# Patient Record
Sex: MALE | Race: WHITE
[De-identification: names, ages, dates, MRNs, and addresses within clinical notes are randomized per-mention and may not be internally consistent; named-entity substitution may affect disease eponyms.]

---

## 2017-02-26 ENCOUNTER — HOSPITAL ENCOUNTER (EMERGENCY)
Dept: HOSPITAL 45 - C.EDB | Age: 57
Discharge: HOME | End: 2017-02-26
Payer: COMMERCIAL

## 2017-02-26 VITALS
BODY MASS INDEX: 24.31 KG/M2 | WEIGHT: 179.46 LBS | HEIGHT: 72.01 IN | WEIGHT: 179.46 LBS | HEIGHT: 72.01 IN | BODY MASS INDEX: 24.31 KG/M2

## 2017-02-26 VITALS — SYSTOLIC BLOOD PRESSURE: 147 MMHG | DIASTOLIC BLOOD PRESSURE: 65 MMHG | HEART RATE: 68 BPM | OXYGEN SATURATION: 98 %

## 2017-02-26 VITALS — TEMPERATURE: 98.6 F

## 2017-02-26 DIAGNOSIS — R51: Primary | ICD-10-CM

## 2017-02-26 DIAGNOSIS — I10: ICD-10-CM

## 2017-02-26 DIAGNOSIS — R52: ICD-10-CM

## 2017-02-26 DIAGNOSIS — Z82.49: ICD-10-CM

## 2017-02-26 DIAGNOSIS — E78.5: ICD-10-CM

## 2017-02-26 DIAGNOSIS — K58.9: ICD-10-CM

## 2017-02-26 DIAGNOSIS — M54.2: ICD-10-CM

## 2017-02-26 LAB
ALBUMIN/GLOB SERPL: 1 {RATIO} (ref 0.9–2)
ALP SERPL-CCNC: 168 U/L (ref 45–117)
ALT SERPL-CCNC: 121 U/L (ref 12–78)
ANION GAP SERPL CALC-SCNC: 6 MMOL/L (ref 3–11)
APPEARANCE CSF: CLEAR
APPEARANCE UR: CLEAR
AST SERPL-CCNC: 46 U/L (ref 15–37)
BASOPHILS # BLD: 0.03 K/UL (ref 0–0.2)
BASOPHILS NFR BLD: 0.4 %
BILIRUB UR-MCNC: (no result) MG/DL
BUN SERPL-MCNC: 11 MG/DL (ref 7–18)
BUN/CREAT SERPL: 13.3 (ref 10–20)
CALCIUM SERPL-MCNC: 9.3 MG/DL (ref 8.5–10.1)
CHLORIDE SERPL-SCNC: 101 MMOL/L (ref 98–107)
CO2 SERPL-SCNC: 32 MMOL/L (ref 21–32)
COLOR CSF: COLORLESS
COLOR UR: (no result)
COMPLETE: YES
CREAT CL PREDICTED SERPL C-G-VRATE: 110.4 ML/MIN
CREAT SERPL-MCNC: 0.83 MG/DL (ref 0.6–1.4)
CSF CHEMISTRY TUBE #: 2
EOSINOPHIL NFR BLD AUTO: 258 K/UL (ref 130–400)
FLUAV RNA SPEC QL NAA+PROBE: (no result)
FLUBV RNA SPEC QL NAA+PROBE: (no result)
GLOBULIN SER-MCNC: 3.9 GM/DL (ref 2.5–4)
GLUCOSE SERPL-MCNC: 95 MG/DL (ref 70–99)
HCT VFR BLD CALC: 41.2 % (ref 42–52)
IG%: 0.5 %
IMM GRANULOCYTES NFR BLD AUTO: 22.9 %
LYME DISEASE AB IGG: (no result)
LYME DISEASE AB IGM: (no result)
LYMPHOCYTES # BLD: 1.75 K/UL (ref 1.2–3.4)
MANUAL MICROSCOPIC REQUIRED?: NO
MCH RBC QN AUTO: 28.7 PG (ref 25–34)
MCHC RBC AUTO-ENTMCNC: 34.5 G/DL (ref 32–36)
MCV RBC AUTO: 83.4 FL (ref 80–100)
MONOCYTES NFR BLD: 7.7 %
NEUTROPHILS # BLD AUTO: 0.8 %
NEUTROPHILS NFR BLD AUTO: 67.7 %
NITRITE UR QL STRIP: (no result)
PH UR STRIP: 5.5 [PH] (ref 4.5–7.5)
PMV BLD AUTO: 10.5 FL (ref 7.4–10.4)
POTASSIUM SERPL-SCNC: 3.9 MMOL/L (ref 3.5–5.1)
PROT CSF-MCNC: 40.8 MG/DL (ref 15–45)
RBC # BLD AUTO: 4.94 M/UL (ref 4.7–6.1)
REVIEW REQ?: NO
SODIUM SERPL-SCNC: 139 MMOL/L (ref 136–145)
SP GR UR STRIP: 1.02 (ref 1–1.03)
URINE BILL WITH OR WITHOUT MIC: (no result)
URINE EPITHELIAL CELL AUTO: (no result) /LPF (ref 0–5)
UROBILINOGEN UR-MCNC: (no result) MG/DL
WBC # BLD AUTO: 7.63 K/UL (ref 4.8–10.8)
XANTHOCHROMIA CSF QL: (no result)
ZZUR CULT IF INDIC CLEAN CATCH: NO

## 2017-02-26 NOTE — DIAGNOSTIC IMAGING REPORT
CHEST ONE VIEW PORTABLE



HISTORY:       Evaluate Fever/Sepsis



COMPARISON: Chest 7/2/2014.



FINDINGS: The lungs are clear. Cardiac silhouette is normal in size. No pleural

effusions. No pneumothorax.



IMPRESSION:

No acute process.







Electronically signed by:  Trip Avina M.D.

2/26/2017 1:32 PM



Dictated Date/Time:  2/26/2017 1:32 PM

## 2017-02-26 NOTE — EMERGENCY ROOM VISIT NOTE
History


Report prepared by Farida:  Rabia Donato


Under the Supervision of:  Dr. Andrews Gusman M.D.


First contact with patient:  12:38


Chief Complaint:  FLU LIKE SX


Stated Complaint:  NECK STIFFNESS,FLU LIKE SYMPTOMS





History of Present Illness


The patient is a 56 year old male who presents to the Emergency Room with 

complaints of a persistent illness that started 10 days ago.  He currently 

rates his discomfort as a 3/10 in severity.  The patient states that last 

Thursday he had eaten a sandwich at a local restaurant and became sick 

afterwards.  He states that he had been vomiting and experiencing diarrhea, but 

states that he felt better after a day.  The patient states that last Saturday 

he started experiencing chills, but denies any fever.  He states that he had 

been up all night, so then that Sunday morning he went to [facility].  The 

patient states that while there he was tested for the flu, but was discharged 

on Prednisone and Azithromycin.  He states that he finished the Z-pack on 

Wednesday and the Prednisone on Thursday.  The patient states that he still 

felt clammy and diaphoretic, and has noticed a dry cough.  He states that he 

has noticed a squeezing pressure behind his eyes.  The patient additionally 

associates rhinorrhea.  The patient states that yesterday he went to the gym 

and did cardio, but states that he felt fine after.  He states that he started 

with the chills again last evening.  The patient states that last night around 

2100 he began experiencing neck stiffness, joint pain, and a headache.  He 

states that [facility] and was sent to the emergency 

department for a lumbar puncture to rule out meningitis.  The patient denies 

any sore throat or urinary symptoms.  He states that he has recently increased 

his fluid intake.  The patient denies any recent travel.  He denies being on 

any blood thinners, but states that he takes Lipitor for his cholesterol.  The 

patient notes a history of a diastolic heart murmur, noting that he follows 

with Dr. Bartlett.  He denies any history of endocarditis.  The patient denies any 

recent dental work.





   Source of History:  patient


   Onset:  10 days ago


   Position:  other (global)


   Symptom Intensity:  3/10


   Quality:  other (illness)


   Timing:  other (persistent)


   Associated Symptoms:  + chills, + diaphoresis, + neck pain (and stiffness), 

No fevers, No sorethroat, No urinary symptoms


Note:


Associated Symptoms: rhinorrhea, joint aches, feeling clammy





Review of Systems


See HPI for pertinent positives & negatives. A total of 10 systems reviewed and 

were otherwise negative.





Past Medical & Surgical


Medical Problems:


(1) Hyperlipidemia Nec/Nos


(2) Hypertension Nos


(3) Irritable Bowel Syndrome








Family History





FH: heart disease


FHx: gallbladder disease


Kidney disease


Kidney stones





Social History


Smoking Status:  Never Smoker


Alcohol Use:  none


Drug Use:  none


Marital Status:  


Housing Status:  lives with family


Occupation Status:  employed





Current/Historical Medications


Scheduled


Aspirin (Aspir-81), 1 TAB PO DAILY


Atorvastatin (Lipitor), 10 MG PO EVERY OTHER DAY


Meloxicam (Meloxicam), 15 MG PO DAILY





Allergies


Coded Allergies:  


     No Known Allergies (Unverified , 2/26/17)





Physical Exam


Vital Signs











  Date Time  Temp Pulse Resp B/P Pulse Ox O2 Delivery O2 Flow Rate FiO2


 


2/26/17 13:55  65 18 132/57 97 Room Air  


 


2/26/17 12:25  75 18 163/69 99 Room Air  


 


2/26/17 11:20 37.0 87 18 185/76 97 Room Air  











Physical Exam


GENERAL: Patient is in no acute distress.


HEENT: No acute trauma, normocephalic atraumatic, mucous membranes moist, no 

nasal congestion, no scleral icterus.


NECK: Tenderness with palpation to the right posterior and lateral neck 

muscles.  Equal carotid upstrokes bilaterally, no cellulitis, no stridor.


LUNGS: Clear to auscultation bilaterally, no wheeze, no rhonchi, breath sounds 

equal.


HEART: 3/6 diastolic murmur with a regular rhythm and rate.


ABDOMEN: Soft, nontender, bowel sounds positive, no hernias, no peritonitis.


EXTREMITIES: No cyanosis or edema, full range of motion of all the joints 

without pain or difficulty, no signs for acute trauma.


NEUROLOGIC: Oriented x 3, no acute motor or sensory deficits, no focal weakness.


SKIN: No rash, no jaundice, no diaphoresis.





Medical Decision & Procedures


ER Provider


Diagnostic Interpretation:


X-ray results as stated below per interpretation by me and the radiologist: 





CHEST ONE VIEW PORTABLE





HISTORY:       Evaluate Fever/Sepsis





COMPARISON: Chest 7/2/2014.





FINDINGS: The lungs are clear. Cardiac silhouette is normal in size. No pleural


effusions. No pneumothorax.





IMPRESSION:


No acute process.





Electronically signed by:  Trip Avina M.D.


2/26/2017 1:32 PM





Dictated Date/Time:  2/26/2017 1:32 PM





Laboratory Results


2/26/17 12:22








Red Blood Count 4.94, Mean Corpuscular Volume 83.4, Mean Corpuscular Hemoglobin 

28.7, Mean Corpuscular Hemoglobin Concent 34.5, Mean Platelet Volume 10.5, 

Neutrophils (%) (Auto) 67.7, Lymphocytes (%) (Auto) 22.9, Monocytes (%) (Auto) 

7.7, Eosinophils (%) (Auto) 0.8, Basophils (%) (Auto) 0.4, Neutrophils # (Auto) 

5.16, Lymphocytes # (Auto) 1.75, Monocytes # (Auto) 0.59, Eosinophils # (Auto) 

0.06, Basophils # (Auto) 0.03





2/26/17 12:22

















Test


  2/26/17


00:00 2/26/17


11:45 2/26/17


12:22 2/26/17


13:04


 


CSF Color COLORLESS    


 


CSF Appearance CLEAR    


 


CSF WBC 3 /uL (0-5)    


 


CSF RBC 2 /uL (0)    


 


CSF Xanthrochromic


  NO


XANTHOCHROMIA 


  


  


 


 


CSF Cell Count Tube # 4    


 


CSF Polynuclear WBCs (%)  %    


 


CSF Chemistry Tube # 2    


 


CSF Glucose


  58 mg/dl


(40-70) 


  


  


 


 


CSF Total Protein


  40.8 mg/dl


(15.0-45.0) 


  


  


 


 


Urine Color  DK YELLOW   


 


Urine Appearance  CLEAR (CLEAR)   


 


Urine pH  5.5 (4.5-7.5)   


 


Urine Specific Gravity


  


  1.020


(1.000-1.030) 


  


 


 


Urine Protein  TRACE (NEG)   


 


Urine Glucose (UA)  NEG (NEG)   


 


Urine Ketones  NEG (NEG)   


 


Urine Occult Blood  NEG (NEG)   


 


Urine Nitrite  NEG (NEG)   


 


Urine Bilirubin  NEG (NEG)   


 


Urine Urobilinogen  NEG (NEG)   


 


Urine Leukocyte Esterase  NEG (NEG)   


 


Urine WBC (Auto)  1-5 /hpf (0-5)   


 


Urine RBC (Auto)  0-4 /hpf (0-4)   


 


Urine Hyaline Casts (Auto)  1-5 /lpf (0-5)   


 


Urine Epithelial Cells (Auto)


  


  5-10 /lpf


(0-5) 


  


 


 


Urine Bacteria (Auto)  NEG (NEG)   


 


White Blood Count


  


  


  7.63 K/uL


(4.8-10.8) 


 


 


Red Blood Count


  


  


  4.94 M/uL


(4.7-6.1) 


 


 


Hemoglobin


  


  


  14.2 g/dL


(14.0-18.0) 


 


 


Hematocrit   41.2 % (42-52)  


 


Mean Corpuscular Volume


  


  


  83.4 fL


() 


 


 


Mean Corpuscular Hemoglobin


  


  


  28.7 pg


(25-34) 


 


 


Mean Corpuscular Hemoglobin


Concent 


  


  34.5 g/dl


(32-36) 


 


 


Platelet Count


  


  


  258 K/uL


(130-400) 


 


 


Mean Platelet Volume


  


  


  10.5 fL


(7.4-10.4) 


 


 


Neutrophils (%) (Auto)   67.7 %  


 


Lymphocytes (%) (Auto)   22.9 %  


 


Monocytes (%) (Auto)   7.7 %  


 


Eosinophils (%) (Auto)   0.8 %  


 


Basophils (%) (Auto)   0.4 %  


 


Neutrophils # (Auto)


  


  


  5.16 K/uL


(1.4-6.5) 


 


 


Lymphocytes # (Auto)


  


  


  1.75 K/uL


(1.2-3.4) 


 


 


Monocytes # (Auto)


  


  


  0.59 K/uL


(0.11-0.59) 


 


 


Eosinophils # (Auto)


  


  


  0.06 K/uL


(0-0.5) 


 


 


Basophils # (Auto)


  


  


  0.03 K/uL


(0-0.2) 


 


 


RDW Standard Deviation


  


  


  38.7 fL


(36.4-46.3) 


 


 


RDW Coefficient of Variation


  


  


  12.8 %


(11.5-14.5) 


 


 


Immature Granulocyte % (Auto)   0.5 %  


 


Immature Granulocyte # (Auto)


  


  


  0.04 K/uL


(0.00-0.02) 


 


 


Erythrocyte Sedimentation Rate


  


  


  38 mm/hr


(0-14) 


 


 


Anion Gap


  


  


  6.0 mmol/L


(3-11) 


 


 


Est Creatinine Clear Calc


Drug Dose 


  


  110.4 ml/min 


  


 


 


Estimated GFR (


American) 


  


  114.8 


  


 


 


Estimated GFR (Non-


American 


  


  99.1 


  


 


 


BUN/Creatinine Ratio   13.3 (10-20)  


 


Calcium Level


  


  


  9.3 mg/dl


(8.5-10.1) 


 


 


Total Bilirubin


  


  


  0.6 mg/dl


(0.2-1) 


 


 


Aspartate Amino Transf


(AST/SGOT) 


  


  46 U/L (15-37) 


  


 


 


Alanine Aminotransferase


(ALT/SGPT) 


  


  121 U/L


(12-78) 


 


 


Alkaline Phosphatase


  


  


  168 U/L


() 


 


 


Total Protein


  


  


  7.7 gm/dl


(6.4-8.2) 


 


 


Albumin


  


  


  3.8 gm/dl


(3.4-5.0) 


 


 


Globulin


  


  


  3.9 gm/dl


(2.5-4.0) 


 


 


Albumin/Globulin Ratio   1.0 (0.9-2)  


 


Lyme Disease IgG Antibody   NEG (NEG)  


 


Lyme Disease IgM Antibody   NEG (NEG)  


 


Monoscreen   NEG (NEG)  


 


Influenza Type A (RT-PCR)


  


  


  


  Neg for Influ


A (NEG)


 


Influenza Type B (RT-PCR)


  


  


  


  Neg for Influ


B (NEG)








Laboratory results reviewed by me.





Medications Administered











 Medications


  (Trade)  Dose


 Ordered  Sig/Jailyn


 Route  Start Time


 Stop Time Status Last Admin


Dose Admin


 


 Sodium Chloride


  (Nss 1000ml)  1,000 ml @ 


 200 mls/hr  Q5H STAT


 IV  2/26/17 12:47


 2/26/17 17:46  2/26/17 13:12


200 MLS/HR


 


 Acetaminophen


  (Tylenol Tab)  1,000 mg  NOW  STAT


 PO  2/26/17 12:47


 2/26/17 12:53 DC 2/26/17 13:15


1,000 MG


 


 Lorazepam


  (Ativan Inj)  0.5 mg  NOW  STAT


 IV  2/26/17 12:47


 2/26/17 12:53 DC 2/26/17 13:16


0.5 MG











Procedure


Lumbar Puncture





Indication: headache.





Verbal consent was obtained after the risks and benefits were explained, 

including but not limited to headache, bleeding/clotting, scarring, infection, 

pain, and bone/joint/nerve damage. At this time, the risks of the procedure are 

less than the risks of NOT performing the procedure. A time out was taken and 

the correct patient and site identified. The patient was placed in the seated 

position and the back was prepped with betadine and draped in the standard 

fashion. The L3 intervertebral space was identified, anesthetized locally with 1

% lidocaine without epinephrine, and the spinal needle was inserted through the 

skin with the bevel parallel to the dural fibers. The needle was carefully 

advanced into the lumbar cistern and 4 tubes of clear CSF was obtained. The 

stylet was replaced and the needle was removed. A bandaid was placed and the 

patient was placed in the supine position. The patient tolerated the procedure 

well and there were no complications.





ED Course


1239: The patient was evaluated in room B11B. A complete history and physical 

exam was performed.





1247: Ordered Ativan Inj 0.5 mg IV, Tylenol Tab 1000 mg PO, Sodium Chloride 

1000 ml @ 200 mls/hr IV.





1300: Ordered Lidocaine HCl 20 ml INFIL.





1346: I performed the lumbar puncture at this time.  See procedure note for 

further detail.





1500: I reevaluated the patient and he is doing well.  I discussed all the exam 

findings with him and I discussed the treatment plan.  He verbalized complete 

understanding and agreement.  He is ready to go home and follow up with Dr. Bartlett tomorrow.





1503: Ordered Rocephin Inj 1 gm IV.





Medical Decision


The patient is a 56 year old male who presents to the ED with complaints of 

illness.  Differential diagnoses considered include viral illness, endocarditis

, lyme disease, pneumonia, meningitis, electrolyte imbalance, influenza, UTI, 

dehydration.





There is no leukocytosis or concerning anemia.  Sedimentation rate is not 

markedly elevated.  There was no significant electrolyte abnormality or kidney 

failure.  A very mild hepatitis was noted.  Mono testing, Lyme testing, 

influenza testing were all negative.  Chest x-ray does not show pneumonia or 

pneumothorax.  On exam, there was no cellulitis.  CSF fluid does not show 

evidence for meningitis.  Blood cultures are pending. 





The patient's illness is likely viral, I am though somewhat concerned because 

of his cardiac murmur.  He is followed on a regular basis for this murmur.  

Endocarditis can sometimes present like this.





The patient received IV saline, IV Ativan, IV Zofran.  He was given oral 

Tylenol.  He did not want anything further for pain.  He was given 1 dose of IV 

ceftriaxone for antibiotic coverage for the possibility of endocarditis-we 

await the blood culture results.





The patient has a follow-up with his cardiologist tomorrow, he can return here 

if worsening.  He will follow with his family doctor as well.  Motrin, Tylenol, 

rest and hydration were encouraged.  Heat to the neck may help as well.  If 

worsening, the patient can return for reassessment.





Impression





 Primary Impression:  


 Headache


 Additional Impressions:  


 Neck pain


 Body aches





Scribe Attestation


The scribe's documentation has been prepared under my direction and personally 

reviewed by me in its entirety. I confirm that the note above accurately 

reflects all work, treatment, procedures, and medical decision making performed 

by me.





Departure Information


Dispostion


Home / Self-Care





Referrals


An Mejía MD (PCP)





Forms


HOME CARE DOCUMENTATION FORM,                                                 

               IMPORTANT VISIT INFORMATION, Work Instructions





Patient Instructions


My [facility]





Additional Instructions





see Dr. Bartlett tomorrow as scheduled


see sherri james this week for recheck as well


rest


fluids


motrin 600 mg 3x per day for 4 days


tylenol for pain and fever as needed


heat to the neck may help


return if worsening





we have blood cultures pending and will call you with positive results





Problem Qualifiers

## 2017-02-27 ENCOUNTER — HOSPITAL ENCOUNTER (INPATIENT)
Dept: HOSPITAL 45 - C.EDB | Age: 57
LOS: 2 days | Discharge: HOME HEALTH | DRG: 871 | End: 2017-03-01
Attending: FAMILY MEDICINE | Admitting: FAMILY MEDICINE
Payer: COMMERCIAL

## 2017-02-27 VITALS
TEMPERATURE: 98.42 F | DIASTOLIC BLOOD PRESSURE: 68 MMHG | HEART RATE: 78 BPM | OXYGEN SATURATION: 97 % | SYSTOLIC BLOOD PRESSURE: 148 MMHG

## 2017-02-27 VITALS
WEIGHT: 174.39 LBS | BODY MASS INDEX: 23.62 KG/M2 | HEIGHT: 72 IN | HEIGHT: 72 IN | BODY MASS INDEX: 23.62 KG/M2 | WEIGHT: 174.39 LBS

## 2017-02-27 VITALS
OXYGEN SATURATION: 98 % | DIASTOLIC BLOOD PRESSURE: 71 MMHG | SYSTOLIC BLOOD PRESSURE: 157 MMHG | TEMPERATURE: 99.5 F | HEART RATE: 69 BPM

## 2017-02-27 VITALS
TEMPERATURE: 98.6 F | SYSTOLIC BLOOD PRESSURE: 127 MMHG | HEART RATE: 68 BPM | DIASTOLIC BLOOD PRESSURE: 61 MMHG | OXYGEN SATURATION: 96 %

## 2017-02-27 VITALS — OXYGEN SATURATION: 98 % | DIASTOLIC BLOOD PRESSURE: 71 MMHG | TEMPERATURE: 99.5 F | SYSTOLIC BLOOD PRESSURE: 157 MMHG

## 2017-02-27 VITALS
HEART RATE: 72 BPM | TEMPERATURE: 98.78 F | DIASTOLIC BLOOD PRESSURE: 69 MMHG | OXYGEN SATURATION: 97 % | SYSTOLIC BLOOD PRESSURE: 163 MMHG

## 2017-02-27 VITALS — OXYGEN SATURATION: 98 %

## 2017-02-27 DIAGNOSIS — A40.0: Primary | ICD-10-CM

## 2017-02-27 DIAGNOSIS — K58.9: ICD-10-CM

## 2017-02-27 DIAGNOSIS — E78.5: ICD-10-CM

## 2017-02-27 DIAGNOSIS — I33.0: ICD-10-CM

## 2017-02-27 DIAGNOSIS — I35.1: ICD-10-CM

## 2017-02-27 LAB
ANION GAP SERPL CALC-SCNC: 3 MMOL/L (ref 3–11)
BASOPHILS # BLD: 0.03 K/UL (ref 0–0.2)
BASOPHILS NFR BLD: 0.4 %
BUN SERPL-MCNC: 13 MG/DL (ref 7–18)
BUN/CREAT SERPL: 17.1 (ref 10–20)
CALCIUM SERPL-MCNC: 9.2 MG/DL (ref 8.5–10.1)
CHLORIDE SERPL-SCNC: 99 MMOL/L (ref 98–107)
CO2 SERPL-SCNC: 33 MMOL/L (ref 21–32)
COMPLETE: YES
CREAT CL PREDICTED SERPL C-G-VRATE: 120.6 ML/MIN
CREAT SERPL-MCNC: 0.76 MG/DL (ref 0.6–1.4)
CRP SERPL-MCNC: 5.34 MG/DL (ref 0–0.29)
EOSINOPHIL NFR BLD AUTO: 270 K/UL (ref 130–400)
GLUCOSE SERPL-MCNC: 87 MG/DL (ref 70–99)
HCT VFR BLD CALC: 40.1 % (ref 42–52)
IG%: 0.7 %
IMM GRANULOCYTES NFR BLD AUTO: 27.9 %
LYMPHOCYTES # BLD: 2.31 K/UL (ref 1.2–3.4)
MCH RBC QN AUTO: 29 PG (ref 25–34)
MCHC RBC AUTO-ENTMCNC: 34.2 G/DL (ref 32–36)
MCV RBC AUTO: 84.8 FL (ref 80–100)
MONOCYTES NFR BLD: 7.9 %
NEUTROPHILS # BLD AUTO: 0.7 %
NEUTROPHILS NFR BLD AUTO: 62.4 %
PMV BLD AUTO: 10.4 FL (ref 7.4–10.4)
POTASSIUM SERPL-SCNC: 3.9 MMOL/L (ref 3.5–5.1)
RBC # BLD AUTO: 4.73 M/UL (ref 4.7–6.1)
SODIUM SERPL-SCNC: 135 MMOL/L (ref 136–145)
WBC # BLD AUTO: 8.28 K/UL (ref 4.8–10.8)

## 2017-02-27 RX ADMIN — VANCOMYCIN HYDROCHLORIDE SCH MLS/HR: 1 INJECTION, POWDER, LYOPHILIZED, FOR SOLUTION INTRAVENOUS at 22:21

## 2017-02-27 RX ADMIN — ACETAMINOPHEN PRN MG: 325 TABLET ORAL at 18:09

## 2017-02-27 RX ADMIN — ACETAMINOPHEN PRN MG: 325 TABLET ORAL at 22:30

## 2017-02-27 NOTE — INFECT. DISEASE CONSULTATION
DATE OF CONSULTATION:  02/27/2017

 

REQUESTING PHYSICIAN:  Darien Alcantara DO

 

HISTORY OF PRESENT ILLNESS:  This is a 56-year-old gentleman who was admitted

after he was contacted by the Emergency Room that he had positive blood

cultures.  He was initially seen in the Emergency Room yesterday when he

presented with cervical pain, headache and subjective fevers and chills at

home.  He states this started almost 2 weeks ago.  He was contacted by the

Emergency Room, when his blood cultures returned positive today with GPCs. 

He has subsequently been admitted and started on vancomycin and Rocephin.  He

did have a lumbar puncture as part of his workup yesterday which was

essentially negative.  He had 3 WBCs.  He had colorless fluid and a negative

Gram stain.  CSF culture is pending.  He did have a Lyme titer drawn

yesterday, which was negative.  His flu swab was negative.  He was given a 1

time dose of Rocephin yesterday in the ER prior to his departure.  He does

have a history of aortic regurgitation and he does get serial echocardiograms

every 6 months.  He has had no change in his echo findings.  His last echo

was done in January.  He did have dental work on February 10th which he

describes as a routine cleaning.  He does not take prophylactic antibiotics

for any dental work.  His other injuries include an injury to the right

second digit sometime ago, between 4 and 5 weeks ago at the gym when he had a

crush injury from weights.  He did have a minor hematoma but that has

resolved completely.  He also does complain of a history of right elbow

injury which did require multiple courses of antibiotics and an eventual

abscess drainage in September 2015.  He does not have any residual complaints

from that injury.  He has had no travel recently.  He currently is retired. 

He does have 1 daughter in the home who has had multiple cardiac surgeries

and does require tracheostomy; however, he states there have been no sick

contacts and she has been doing well.  He states that around 1-1/2 to 2 weeks

ago after eating out and having a salad and a sandwich, he did have 1 episode

of diarrhea.  He was out with his wife and she did not have any similar

complaints.  He denies any fevers and chills at that time.  He states over

the next few days, he did feel lethargic and had very little energy.  He did

have periods of subjective chills but does not feel that he had a fever.  He

denies any chest pain associated with this.  He was seen at an urgent care

centered within the last week and was given a Z-DARCI and prednisone taper.  He

states he felt well after that but only for a few days and then his symptoms

began to return.  This ended with severe headache and neck pain yesterday

which required him to present to the Emergency Room where he underwent a

lumbar puncture.  Again, this was unremarkable.  He is due to have an

echocardiogram today and an MRI of the cervical spine.  He is on tentatively

tomorrow for a DEIDRE depending on the results of his transthoracic echo.  He

was placed on vancomycin and Rocephin.  His white blood cell count has been

normal and he has been afebrile; however, his sed rate is elevated at 40 and

his CRP is 5.3.  Repeat blood cultures have not been done to date.  All

remaining review of systems are reviewed and are negative except as noted

above.

 

FAMILY HISTORY:  Noncontributory.

 

PAST MEDICAL HISTORY:  Significant for aortic regurgitation.

 

PAST SURGICAL HISTORY:  There is no surgical history.

 

SOCIAL HISTORY:  Negative for tobacco use or drug use.  He drinks

occasionally.  He is  and lives with his family.  He does not have any

recent travel.  He has no known sick contacts.

 

ALLERGIES:  No known drug allergies.

 

CURRENT MEDICATIONS:  Tylenol, Maalox, milk of magnesia, Ambien, Nitrostat,

and MiraLax.  He has also been restarted on Rocephin and vancomycin.

 

LABORATORY STUDIES:  CBC reveals a white blood cell count of 8.2, hemoglobin

13.7, hematocrit 40.1, platelets are 270, sed rate is 40.  Chemistry panel

reveals a sodium of 135, potassium 3.9, chloride 99, bicarbonate 33, BUN 13,

creatinine 0.7, glucose is 87.  CRP is 5.3.  Again, blood cultures from the

26th are growing gram positive cocci.  Chest x-ray done in the ER yesterday

was unremarkable.

 

ASSESSMENT AND PLAN:  Gram-positive septicemia with history of heart murmur

concerning for endocarditis, especially with recent dental work.  He will be

continued on broad spectrum antibiotics.  I will follow the result of his MRI

as well as his transthoracic echo.  He may in fact need transesophageal

echocardiography in the morning and will be made n.p.o. tentatively for this.

 Repeat blood cultures will be obtained.  We will follow along with you. 

Thank you for this consultation.

## 2017-02-27 NOTE — DIAGNOSTIC IMAGING REPORT
MRI OF THE CERVICAL SPINE WITH AND WITHOUT CONTRAST



CLINICAL HISTORY: Neck pain. Positive blood cultures. Endocarditis. Septicemia. 

   



COMPARISON: None.



TECHNIQUE:  Utilizing a 1.5 Farideh magnet and dedicated coil, multiplanar,

multiecho imaging of the cervical spine was performed before and after

intravenous administration of 8 of Gadavist.



FINDINGS: 

 

Alignment of the cervical spine is anatomic. Vertebral body heights are

maintained. There is no marrow replacement. Discogenic changes centered at the

C5-C6 level are noted. Cervical cord signal and caliber are normal. There is no

intracanalicular mass or fluid collection. Visualized portions of the posterior

fossa are unremarkable. There is no abnormal enhancement within the cervical

canal. Paravertebral soft tissues are unremarkable. There is no evidence for

discitis within the cervical spine or visualized portions of the upper thoracic

spine.



C2-C3:  The central canal and the neural foramen are patent.



C3-C4:  The central canal and neural foramen are patent.



C4-C5:  There is mild posterior disc osteophyte complex. There is mild narrowing

of the central canal. There is moderate narrowing of the right neural foramen

and mild narrowing of the left neural foramen appear



C5-C6: Left paracentral disc osteophyte complex is noted. There is mild to

moderate narrowing of the left aspect of the canal. There is moderate left

neural foraminal stenosis.



C6-C7:  There is disc space narrowing with posterior disc osteophyte complex.

There is mild narrowing of the central canal. There is moderate narrowing of the

left neural foramen.



C7-T1:  Left paracentral disc osteophyte complex results in mild narrowing of

the canal. Neural foramen are patent.



IMPRESSION: 



1. No acute process within the cervical spine by MRI. No epidural abscess or

evidence for discitis.



2. Moderate multilevel degenerative changes of the cervical spine superimposed

upon a congenitally narrow canal. Mild to moderate multilevel central canal and

neural foraminal stenosis, as detailed above.







Electronically signed by:  Yobani Durant M.D.

2/27/2017 6:56 PM



Dictated Date/Time:  2/27/2017 6:49 PM

## 2017-02-27 NOTE — HISTORY AND PHYSICAL
History


General


Date of Service:


Feb 27, 2017.


Stated Complaint:


Flu-Like Sx





History of Present Illness


56M with a PMHx of diastolic heart murmur x 1 year presents for a follow up of 

positive blood cultures.  Patient was seen in the ER yesterday for flu like 

symptoms including fevers, chills, neck stiffness and general malaise x 10 

days.  Over the past 10 days patient took a Z pack with steroids and felt 

marginally better. Patient denies coughing, dysuria, sore throat, boils or skin 

infections.  Patient does remember slamming his finger between two weights at 

the gym approximately 6 weeks ago and continued right index finger pain and 

swelling.  Pt reports at the time of injury he broke the skin and his finger 

was bleeding.  Patient sees Dr. Bartlett as his cardiologist for his diastolic 

murmur - had a follow up appointment scheduled today at 3pm.  Pt's last 

echocardiogram was on Jan 4th 2017 with the findings: Mildly dilated left 

ventricle with normal systolic function and wall motion.  EF 60-65%.  Mild 

concentric left ventricular hypertrophy.  At least moderate, eccentric, aortic 

regurgitation.  There is mild mitral regurgitation. 





ROS: MSK chest pain while lifting his 80lb wheelchair bound daughter.   No 

recent dental work.  5lb weight loss in the past 10 days. 





Allergies: NKDA


FMHx: Father had an MI. 


SHx:  Denies illicit drug use.


Historian:  patient





Review of Systems


Constitutional:  + chills, + fever, + sweats, + weight loss


Respiratory:  No cough, No shortness of breath, No sputum, No wheezing


Male :  No dysuria





Family History





FH: heart disease


FHx: gallbladder disease


Kidney disease


Kidney stones





Social History


Smoking Status:  Never Smoker


Alcohol:  none


Drug Use:  none


Marital Status:  


Housing Status:  lives with family


Occupation Status:  employed





History of MDRO


History of MDRO:  No





Allergies


Coded Allergies:  


     No Known Allergies (Unverified , 2/27/17)





Current Home Medications





 Reported Home Medications








 Medications  Dose


 Route/Sig


 Max Daily Dose Days Date Category


 


 Advil (Ibuprofen)


 200 Mg Tab  600 Mg


 PO UD


    2/27/17 Reported


 


 Aspir-81


  (Aspirin) 81 Mg


 Tab  1 Tab


 PO DAILY


    8/15/15 Reported


 


 Lipitor


  (Atorvastatin


 Calcium) 10 Mg Tab  10 Mg


 PO EVERY OTHER DAY


    9/19/08 Reported











Physical Exam











  Date Time  Temp Pulse Resp B/P Pulse Ox O2 Delivery O2 Flow Rate FiO2


 


2/27/17 13:29  76 18 146/60 98   


 


2/27/17 13:11  70 18 146/60 97 Room Air  


 


2/27/17 12:39  73      


 


2/27/17 12:35  72 20 154/72 98 Room Air  


 


2/27/17 11:00  82 20 193/79 99 Room Air  








Weight in Kilograms:  81.200


Constitutional:  


   General Apperance:  heathly-appearing, well-nourished, well-developed


   Level of Distress:  NAD


   Ambulation:  ambulating normally


Head:  normocephalic, atraumatic


Lungs:  


   Respiratory effort:  no dyspnea, good air movement


   Auscultation:  breath sounds normal, CTA except as noted, no wheezing, no 

rales/crackles, no rhonchi


Cardiovascular:  


   Heart Auscultation:  RRR, murmur (pan diastolic ejection murmur.  )


Peripheral Pulses:  


   Bruits:  none appreciated


   Radial Pulse:  normal on the left, normal on the right


   Dorsalis Pedis Pulse:  normal on the left, normal on the right


Extremities:  no cyanosis, no edema, no varicosities, no palpable cord, no 

clubbing, pertinent finding (Pt has a swollen and mildly tender right index 

finger.  FROM in the finger.  The nail on the 2nd R digit has signs of previous 

trauma.  Possible spinter hemorrhage on the R 5th nail.)


Neurologic:  


   Gait & Station:  normal gait, normal station


   Cranial Nerves:  grossly intact


   Sensation:  grossly intact





Diagnostics


Laboratory Results





Results Past 24 Hours








Test


  2/27/17


11:38 2/27/17


11:39 Range/Units


 


 


White Blood Count 8.28  4.8-10.8  K/uL


 


Red Blood Count 4.73  4.7-6.1  M/uL


 


Hemoglobin 13.7  14.0-18.0  g/dL


 


Hematocrit 40.1  42-52  %


 


Mean Corpuscular Volume 84.8    fL


 


Mean Corpuscular Hemoglobin 29.0  25-34  pg


 


Mean Corpuscular Hemoglobin


Concent 34.2


  


  32-36  g/dl


 


 


Platelet Count 270  130-400  K/uL


 


Mean Platelet Volume 10.4  7.4-10.4  fL


 


Neutrophils (%) (Auto) 62.4   %


 


Lymphocytes (%) (Auto) 27.9   %


 


Monocytes (%) (Auto) 7.9   %


 


Eosinophils (%) (Auto) 0.7   %


 


Basophils (%) (Auto) 0.4   %


 


Neutrophils # (Auto) 5.17  1.4-6.5  K/uL


 


Lymphocytes # (Auto) 2.31  1.2-3.4  K/uL


 


Monocytes # (Auto) 0.65  0.11-0.59  K/uL


 


Eosinophils # (Auto) 0.06  0-0.5  K/uL


 


Basophils # (Auto) 0.03  0-0.2  K/uL


 


RDW Standard Deviation 40.2  36.4-46.3  fL


 


RDW Coefficient of Variation 12.9  11.5-14.5  %


 


Immature Granulocyte % (Auto) 0.7   %


 


Immature Granulocyte # (Auto) 0.06  0.00-0.02  K/uL


 


Erythrocyte Sedimentation Rate 40  0-14  mm/hr


 


Sodium Level 135  136-145  mmol/L


 


Potassium Level 3.9  3.5-5.1  mmol/L


 


Chloride Level 99    mmol/L


 


Carbon Dioxide Level 33  21-32  mmol/L


 


Anion Gap 3.0  3-11  mmol/L


 


Blood Urea Nitrogen 13  7-18  mg/dl


 


Creatinine


  0.76


  


  0.60-1.40


mg/dl


 


Est Creatinine Clear Calc


Drug Dose 120.6


  


   ml/min


 


 


Estimated GFR (


American) 119.0


  


   


 


 


Estimated GFR (Non-


American 102.7


  


   


 


 


BUN/Creatinine Ratio 17.1  10-20  


 


Random Glucose 87  70-99  mg/dl


 


Calcium Level 9.2  8.5-10.1  mg/dl


 


C-Reactive Protein 5.34  0-0.29  mg/dl


 


Bedside Lactic Acid Venous


  


  1.04


  0.90-1.70


mmol/L








EKG Interpretation:  other (No recent EKG available.)





Impression


Assessment and Plan


56M with a PMHx of diastolic murmur presents with a 10 day history of fevers 

and chills and positive blood cultures.  Patient is stable. Pt was started on 

Ceftriaxone and Vanco.  





1. Bacteremia, possible endocarditis.


- Blood cultures revealed gram positive cocci, sensitivities pending.  Source 

may be from finger.   


- TTE echocardiogram today, consider DEIDRE tomorrow. 


- c.w Ceftriaxone and Vanco, defer to ID for Abx coverage.


- Will order EKG.


- Will make NPO for tomorrow DEIDRE.





2. Diastolic Murmur x 1 year


- Pt sees Dr. Bartlett, has had an echocardiogram in January showing moderate 

Aortic Regurgitation. 


- Pt does not have any current symptoms of CHF.  


- f/u echo and EKG.





Level of Care


Telemetry


Resident Involvement:  Resident Care Provided


Care Provided:  Adult Hospital Medicine

## 2017-02-27 NOTE — HISTORY AND PHYSICAL
History & Physical


Date & Time of Service:


Feb 27, 2017 at 14:13


Chief Complaint:


Endocarditis; Septicemia


Primary Care Physician:


An Mejía MD


History of Present Illness


Source:  patient, hospital records


56 -year-old male presents to the emergency department for the second 

consecutive day after he was notified by emergency department staff of positive 

blood cultures from yesterday's visit.





Previous to his visit yesterday, the patient noted approximately 10-14 day 

history of generalized illness.  The patient recalls eating a sandwich which 

included cooked oysters at a local restaurant and afterwards she became ill 

with gastrointestinal complaints.  He notes nausea and vomiting along with 

diarrhea but these symptoms were short-lived and resolved by the following day.

  Two or three days later (the ensuing Saturday) the patient started to 

experience chills.  The following day, Sunday, he went to [facility] and was 

tested for influenza.  His test for influenza was negative the patient was sent 

home with prescriptions for azithromycin and prednisone.  He completed the 

azithromycin on Wednesday and the prednisone on Thursday.  The patient states 

that he still had intermittent clamminess and some diaphoreses along with an 

occasional dry cough.  Additionally, he noted a posterior neck pain which was 

exacerbated when looking towards the right.  He also describes some rhinorrhea 

and generalized sinus congestion with pressure behind his eyes.  2 days ago, 

the patient went to the gym and did his usual cardio workout and felt fine 

afterwards.  However, later that evening, the patient stated that he had chills 

once again.





The patient was seen and [facility] a second time and based on his symptoms 

referred to the emergency department.  An yesterday's visit to the emergency 

department he underwent a lumbar puncture, blood work, and blood cultures.  

Today when the blood cultures returned positive, he was contacted to return to 

the emergency department.





Coincidentally he had an appointment with Dr. Bartlett scheduled for 2 PM today.  

This was a routine visit for follow-up of a heart murmur.  The patient had a 

outpatient echocardiogram in January of this year.





Family History





FH: heart disease


FHx: gallbladder disease


Kidney disease


Kidney stones


Noncontributory





Social History


Smoking Status:  Never Smoker


Smokeless Tobacco Use:  No


Alcohol Use:  occasionally


Drug Use:  none


Marital Status:  


Housing status:  lives with family


Occupational Status:  retired (he retired approximately one year ago.)





Immunizations


History of Influenza Vaccine:  Unknown


History of Tetanus Vaccine?:  Unknown


History of Pneumococcal:  No


History of Hepatitis B Vaccine:  Unknown





Multi-Drug Resistant Organisms


History of MDRO:  No





Allergies


Coded Allergies:  


     No Known Allergies (Unverified , 2/27/17)





Home Medications


Scheduled


Aspirin (Aspir-81), 1 TAB PO DAILY


Atorvastatin (Lipitor), 10 MG PO EVERY OTHER DAY


Ibuprofen (Advil), 600 MG PO UD





Review of Systems


Constitutional:  + chills, + fatigue


Eyes:  No problem reported


ENT:  + nasal symptoms, No dental problems, No sore throat, No trouble 

swallowing, No unusual epistaxis


Respiratory:  + cough, + shortness of breath, No dyspnea at rest, No dyspnea on 

exertion, No hemoptysis, No sputum, No wheezing


Cardiovascular:  No chest pain, No palpitations


Abdomen:  No problem reported


Musculoskeletal:  No calf pain, No joint pain, No muscle pain, No swelling


Genitourinary - Male:  No dysuria, No hematuria


Neurologic:  No balance problems, No numbness/tingling, No vertigo


Psychiatric:  No problem reported


Endocrine:  + fatigue


Integumentary:  No rash





Physical Exam


Vital Signs











  Date Time  Temp Pulse Resp B/P Pulse Ox O2 Delivery O2 Flow Rate FiO2


 


2/27/17 13:29  76 18 146/60 98   


 


2/27/17 13:11  70 18 146/60 97 Room Air  


 


2/27/17 12:39  73      


 


2/27/17 12:35  72 20 154/72 98 Room Air  


 


2/27/17 11:00  82 20 193/79 99 Room Air  








General Appearance:  WD/WN, no apparent distress


Head:  normocephalic, atraumatic


Eyes:  normal inspection, PERRL, EOMI


ENT:  normal ENT inspection, hearing grossly normal, TMs normal, pharynx normal


Neck:  supple, no adenopathy, thyroid normal, no JVD, no carotid bruits, 

trachea midline


Respiratory/Chest:  chest non-tender, lungs clear, normal breath sounds, no 

respiratory distress, no accessory muscle use


Cardiovascular:  regular rate, rhythm, + systolic murmur (3 to 4/6 holosystolic 

pronounced both at the right sternal border and apex.)


Abdomen/GI:  normal bowel sounds, non tender, soft, no organomegaly


Back:  normal inspection (there is no tenderness along the cervical spine with 

palpation.  His right cervical rotation is limited secondary to discomfort.  

His left cervical rotation is not restricted.  His cervical flexion and 

cervical extension is an range of motion without restriction.)


Extremities/Musculoskelatal:  normal inspection, no calf tenderness, normal 

capillary refill


Neurologic/Psych:  no motor/sensory deficits, alert, normal mood/affect, 

oriented x 3


Skin:  normal color, warm/dry, no rash





Diagnostics


Laboratory Results





Results Past 24 Hours








Test


  2/27/17


11:38 2/27/17


11:39 Range/Units


 


 


White Blood Count 8.28  4.8-10.8  K/uL


 


Red Blood Count 4.73  4.7-6.1  M/uL


 


Hemoglobin 13.7  14.0-18.0  g/dL


 


Hematocrit 40.1  42-52  %


 


Mean Corpuscular Volume 84.8    fL


 


Mean Corpuscular Hemoglobin 29.0  25-34  pg


 


Mean Corpuscular Hemoglobin


Concent 34.2


  


  32-36  g/dl


 


 


Platelet Count 270  130-400  K/uL


 


Mean Platelet Volume 10.4  7.4-10.4  fL


 


Neutrophils (%) (Auto) 62.4   %


 


Lymphocytes (%) (Auto) 27.9   %


 


Monocytes (%) (Auto) 7.9   %


 


Eosinophils (%) (Auto) 0.7   %


 


Basophils (%) (Auto) 0.4   %


 


Neutrophils # (Auto) 5.17  1.4-6.5  K/uL


 


Lymphocytes # (Auto) 2.31  1.2-3.4  K/uL


 


Monocytes # (Auto) 0.65  0.11-0.59  K/uL


 


Eosinophils # (Auto) 0.06  0-0.5  K/uL


 


Basophils # (Auto) 0.03  0-0.2  K/uL


 


RDW Standard Deviation 40.2  36.4-46.3  fL


 


RDW Coefficient of Variation 12.9  11.5-14.5  %


 


Immature Granulocyte % (Auto) 0.7   %


 


Immature Granulocyte # (Auto) 0.06  0.00-0.02  K/uL


 


Erythrocyte Sedimentation Rate 40  0-14  mm/hr


 


Sodium Level 135  136-145  mmol/L


 


Potassium Level 3.9  3.5-5.1  mmol/L


 


Chloride Level 99    mmol/L


 


Carbon Dioxide Level 33  21-32  mmol/L


 


Anion Gap 3.0  3-11  mmol/L


 


Blood Urea Nitrogen 13  7-18  mg/dl


 


Creatinine


  0.76


  


  0.60-1.40


mg/dl


 


Est Creatinine Clear Calc


Drug Dose 120.6


  


   ml/min


 


 


Estimated GFR (


American) 119.0


  


   


 


 


Estimated GFR (Non-


American 102.7


  


   


 


 


BUN/Creatinine Ratio 17.1  10-20  


 


Random Glucose 87  70-99  mg/dl


 


Calcium Level 9.2  8.5-10.1  mg/dl


 


C-Reactive Protein 5.34  0-0.29  mg/dl


 


Bedside Lactic Acid Venous


  


  1.04


  0.90-1.70


mmol/L











Diagnostic Radiology


Chest x-ray is pending





EKG


EKG is pending





Impression


Assessment and Plan





56 year old male with prostate 10 day history of generalized illness, fatigue, 

and chills in the setting of positive blood cultures for gram-positive cocci 

and a pronounced cardiac murmur and right-sided neck pain.  He had a 5 day 

course of azithromycin and a 6 day course of prednisone within the course of 

the illness.





PLAN


1 admit to telemetry


2 consult cardiology and infectious disease; continue vancomycin and 

ceftriaxone. 


3 transthoracic echocardiogram today; likely will need transesophageal 

echocardiogram tomorrow


4 continue vancomycin and ceftriaxone with pharmacy and infectious disease 

consult.


5 follow ESR and CRP.  We'll check rheumatoid factor, as can be a elevated in 

case of IE. 


6 given his neck pain will check MRI of the C-spine.





Level of Care


Telemetry





Resuscitation Status


FULL RESUSCITATION





VTE Prophylaxis


VTE Risk Assessment Done? Y/N:  Yes


Risk Level:  Low


Given or contraindicated:  T.E.D. Stockings

## 2017-02-27 NOTE — ECHOCARDIOGRAM REPORT
*NOTICE TO RECEIVING PARTY AGENCY**  This information is strictly Confidential and protected under 
[place] law.  [place] law prohibits you from making any further disclosure of this 
information unless further disclosure is expressly permitted by the written consent of the person to 
whom it pertains or is authorized by law.  A general authorization for the release of medical or 
other information is not sufficient for this purpose.  Hospital accepts no responsibility if the 
information is made available to any other person, INCLUDING THE PATIENT.



Interpretation Summary

  *  Name: KANA MUNOZ  Study Date: 2017 03:24 PM  BP: 157/71 mmHg

  *  MRN: [id number]  Patient Location: [facility]  HR: 69

  *  : 1960 (M/d/yyyy)  Gender: Male  Height: 864 in

  *  Age: 56 yrs  Ethnicity: [place]  Weight: 179 lb

  *  Ordering Physician: Timi Alcantara

  *  Performed By: MICHELE Benavides RCS

  *  Accession# [id number]  Account# [id number]

  *  Reason For Study: Valvular Heart Dz

  *  BSA: 12.3 m2

  *  -- Conclusions --

  *  The left ventricle is moderately dilated.

  *  Left ventricular systolic function is low normal.

  *  Ejection Fraction = 50-55%.

  *  There is normal left ventricular wall thickness.

  *  No regional wall motion abnormalities noted.

  *  No hemodynamically significant valvular aortic stenosis.

  *  Moderate to severe aortic regurgitation.

  *  The aortic root is normal size.

  *  Aortic arch of normal dimension.

  *  No obvious dissection could be visualized.

  *  There is no pericardial effusion.

  *  There is mild tricuspid regurgitation.

  *  Right ventricular systolic pressure is normal.

Procedure Details

  *  A complete two-dimensional transthoracic echocardiogram was performed (2D, M-mode, Doppler and 
color flow Doppler).

Left Ventricle

  *  The left ventricle is moderately dilated.

  *  There is normal left ventricular wall thickness.

  *  Left ventricular systolic function is low normal.

  *  Ejection Fraction = 50-55%.

  *  No regional wall motion abnormalities noted.

Right Ventricle

  *  The right ventricle is normal in size and function.

Atria

  *  The left atrial size is normal.

  *  Right atrial size is normal.

  *  The interatrial septum is intact with no evidence for an atrial septal defect.

Mitral Valve

  *  The mitral valve is normal in structure and function.

  *  Significant mitral regurgitation is absent.

Tricuspid Valve

  *  The tricuspid valve is normal in structure and function.

  *  There is mild tricuspid regurgitation.

  *  Right ventricular systolic pressure is normal.

Aortic Valve

  *  The aortic valve is trileaflet.

  *  The aortic valve opens well.

  *  No hemodynamically significant valvular aortic stenosis.

  *  Moderate to severe aortic regurgitation.

Pulmonic Valve

  *  The pulmonary valve is inadequately visualized, but the Doppler data is adequate for 
interpretation.

  *  There is no significant pulmonary regurgitation.

Great Vessels

  *  The aortic root is normal size.

  *  Aortic arch of normal dimension.

  *  No obvious dissection could be visualized.

  *  The pulmonary artery is not well visualized, but is probably normal size.

Pericardium/Pleural

  *  There is no pericardial effusion.

Great Vessels

  *  Normal inferior vena cava diameter and respiratory variation suggests normal central venous 
pressure.



MMode 2D Measurements and Calculations

IVSd 1.0 cm

IVSs 1.3 cm



LVIDd 6.6 cm

LVIDs 4.6 cm

LVPWd 1.0 cm

LVPWs 1.6 cm



IVS/LVPW 10 

FS 30.2 %

EDV(Teich) 225.4 ml

ESV(Teich) 98.5 ml

EF(Teich) 56.3 %



EDV(cubed) 290.6 ml

ESV(cubed) 98.9 ml

EF(cubed) 66.0 %

% IVS thick 27.4 %

% LVPW thick 54.7 %





LV mass(C)d 308.0 grams

LV mass(C)dI 25.0 grams/m\S\2

LV mass(C)s 278.4 grams

LV mass(C)sI 22.6 grams/m\S\2



CO(Teich) 9.5 l/min

CI(Teich) 0.77 l/min/m\S\2

SV(Teich) 126.9 ml

SI(Teich) 10.3 ml/m\S\2

CO(cubed) 14.4 l/min

CI(cubed) 1.2 l/min/m\S\2

SV(cubed) 191.7 ml

SI(cubed) 15.6 ml/m\S\2



Ao root diam 4.3 cm

Ao root area 14.8 cm\S\2

ACS 2.4 cm

LA dimension 4.1 cm



asc Aorta Diam 3.6 cm





LA/Ao 0.94 

LVOT diam 2.6 cm

LVOT area 5.5 cm\S\2



LVAd ap4 60.3 cm\S\2

LVLd ap4 10.2 cm

EDV(MOD-sp4) 292.0 ml

LVAs ap4 34.2 cm\S\2

LVLs ap4 8.5 cm

ESV(MOD-sp4) 113.0 ml

EF(MOD-sp4) 61.3 %



LVAd ap2 48.0 cm\S\2

LVLd ap2 9.9 cm

EDV(MOD-sp2) 191.0 ml

LVAs ap2 29.5 cm\S\2

LVLs ap2 8.7 cm

ESV(MOD-sp2) 81.0 ml

EF(MOD-sp2) 57.6 %



CO(MOD-sp4) 13.4 l/min

CI(MOD-sp4) 1.1 l/min/m\S\2

SV(MOD-sp4) 179.0 ml

SI(MOD-sp4) 14.5 ml/m\S\2





CO(MOD-sp2) 8.3 l/min

CI(MOD-sp2) 0.67 l/min/m\S\2

SV(MOD-sp2) 110.0 ml

SI(MOD-sp2) 8.9 ml/m\S\2













Doppler Measurements and Calculations

MV P1/2t max carlee 360.1 cm/sec

MV P1/2t 95.9 msec

MVA(P1/2t) 2.3 cm\S\2

MV dec slope 1099.8 cm/sec\S\2



Ao V2 max 245.7 cm/sec

Ao max PG 24.1 mmHg

Ao max PG (full) 11.2 mmHg

Ao V2 mean 167.0 cm/sec

Ao mean PG 12.6 mmHg

Ao mean PG (full) 6.0 mmHg

Ao V2 VTI 48.8 cm

PAIGE(I,A) 4.1 cm\S\2

PAIGE(I,D) 4.1 cm\S\2

PAIGE(V,A) 4.0 cm\S\2

PAIGE(V,D) 4.0 cm\S\2



AI max carlee 424.8 cm/sec

AI max PG 72.2 mmHg

AI dec slope 478.6 cm/sec\S\2

AI P1/2t 260.0 msec



LV V1 max PG 13.0 mmHg

LV V1 mean PG 6.7 mmHg





LV V1 max 180.2 cm/sec

LV V1 mean 121.6 cm/sec

LV V1 VTI 36.8 cm



SV(Ao) 722.5 ml

SI(Ao) 58.7 ml/m\S\2

SV(LVOT) 201.2 ml

SI(LVOT) 16.3 ml/m\S\2



PA V2 max 102.3 cm/sec

PA max PG 4.2 mmHg



TR max carlee 358.9 cm/sec

## 2017-02-27 NOTE — EMERGENCY ROOM VISIT NOTE
History


Report prepared by Farida:  Tiffanie Prescott


Under the Supervision of:  Dr. Leonides Pabon M.D.


First contact with patient:  11:03


Chief Complaint:  FLU LIKE SX


Stated Complaint:  FLU-LIKE SX





History of Present Illness


The patient is a 56 year old male who presents to the Emergency Room with 

complaints of persistent flu-like illness starting about 2 weeks ago. The 

patient was evaluated in the Emergency Room yesterday for flu-like illness. He 

had a lumbar puncture yesterday with negative results. He had positive blood 

cultures. He complains of headache, chills, and diarrhea. He has lost some 

weight in the past week. He denies any fevers, chest pain, shortness of breath, 

vomiting, abdominal pain, weakness, or any other complaints. He was diagnosed 

with a heart murmur about a year ago. The patient denies any IV drug use.





   Source of History:  patient


   Onset:  about 2 weeks ago


   Position:  other (global)


   Quality:  other (flu-like illness)


   Timing:  other (persistent)


   Associated Symptoms:  + chills, + diarrhea, + headache, No SOB, No abdominal 

pain, No chest pain, No fevers, No vomiting, No weakness





Review of Systems


See HPI for pertinent positives & negatives. A total of 10 systems reviewed and 

were otherwise negative.





Past Medical & Surgical


Medical Problems:


(1) Endocarditis


(2) Hyperlipidemia Nec/Nos


(3) Hypertension Nos


(4) Irritable Bowel Syndrome


(5) Septicemia








Family History





FH: heart disease


FHx: gallbladder disease


Kidney disease


Kidney stones





Social History


Smoking Status:  Never Smoker


Alcohol Use:  none


Drug Use:  none


Marital Status:  


Housing Status:  lives with family


Occupation Status:  employed





Current/Historical Medications


Scheduled


Aspirin (Aspir-81), 1 TAB PO DAILY


Atorvastatin (Lipitor), 10 MG PO EVERY OTHER DAY


Ibuprofen (Advil), 600 MG PO UD





Allergies


Coded Allergies:  


     No Known Allergies (Unverified , 2/27/17)





Physical Exam


Vital Signs











  Date Time  Temp Pulse Resp B/P Pulse Ox O2 Delivery O2 Flow Rate FiO2


 


2/27/17 12:35  72 20 154/72 98 Room Air  


 


2/27/17 11:00  82 20 193/79 99 Room Air  











Physical Exam


GENERAL: Patient is well appearing and in minimal distress. He is mildly 

anxious appearing. 


HEENT: No acute trauma, normocephalic atraumatic, mucous membranes moist, no 

nasal congestion, no scleral icterus.


NECK: No stridor, no adenopathy, no meningismus, trachea is midline.


LUNGS: No dyspnea. Clear to auscultation and equal bilaterally. No wheeze, no 

rhonchi.


HEART: Regular rate and rhythm.  Diastolic murmur. 


ABDOMEN: Soft, nontender, bowel sounds positive, no masses appreciated, no 

peritonitis.


BACK: No midline tenderness, no CVA tenderness


EXTREMITIES: Normal motion all extremities, no cyanosis, no edema.


NEUROLOGIC: Alert and oriented, no acute motor or sensory deficits, no focal 

weakness, cranial nerves grossly intact.


SKIN: No rash, no jaundice, no diaphoresis. No evidence of splinter hemorrhages.





Medical Decision & Procedures


Laboratory Results


2/27/17 11:38








Red Blood Count 4.73, Mean Corpuscular Volume 84.8, Mean Corpuscular Hemoglobin 

29.0, Mean Corpuscular Hemoglobin Concent 34.2, Mean Platelet Volume 10.4, 

Neutrophils (%) (Auto) 62.4, Lymphocytes (%) (Auto) 27.9, Monocytes (%) (Auto) 

7.9, Eosinophils (%) (Auto) 0.7, Basophils (%) (Auto) 0.4, Neutrophils # (Auto) 

5.17, Lymphocytes # (Auto) 2.31, Monocytes # (Auto) 0.65, Eosinophils # (Auto) 

0.06, Basophils # (Auto) 0.03





2/27/17 11:38

















Test


  2/27/17


11:38 2/27/17


11:39


 


White Blood Count


  8.28 K/uL


(4.8-10.8) 


 


 


Red Blood Count


  4.73 M/uL


(4.7-6.1) 


 


 


Hemoglobin


  13.7 g/dL


(14.0-18.0) 


 


 


Hematocrit 40.1 % (42-52)  


 


Mean Corpuscular Volume


  84.8 fL


() 


 


 


Mean Corpuscular Hemoglobin


  29.0 pg


(25-34) 


 


 


Mean Corpuscular Hemoglobin


Concent 34.2 g/dl


(32-36) 


 


 


Platelet Count


  270 K/uL


(130-400) 


 


 


Mean Platelet Volume


  10.4 fL


(7.4-10.4) 


 


 


Neutrophils (%) (Auto) 62.4 %  


 


Lymphocytes (%) (Auto) 27.9 %  


 


Monocytes (%) (Auto) 7.9 %  


 


Eosinophils (%) (Auto) 0.7 %  


 


Basophils (%) (Auto) 0.4 %  


 


Neutrophils # (Auto)


  5.17 K/uL


(1.4-6.5) 


 


 


Lymphocytes # (Auto)


  2.31 K/uL


(1.2-3.4) 


 


 


Monocytes # (Auto)


  0.65 K/uL


(0.11-0.59) 


 


 


Eosinophils # (Auto)


  0.06 K/uL


(0-0.5) 


 


 


Basophils # (Auto)


  0.03 K/uL


(0-0.2) 


 


 


RDW Standard Deviation


  40.2 fL


(36.4-46.3) 


 


 


RDW Coefficient of Variation


  12.9 %


(11.5-14.5) 


 


 


Immature Granulocyte % (Auto) 0.7 %  


 


Immature Granulocyte # (Auto)


  0.06 K/uL


(0.00-0.02) 


 


 


Erythrocyte Sedimentation Rate


  40 mm/hr


(0-14) 


 


 


Anion Gap


  3.0 mmol/L


(3-11) 


 


 


Est Creatinine Clear Calc


Drug Dose 120.6 ml/min 


  


 


 


Estimated GFR (


American) 119.0 


  


 


 


Estimated GFR (Non-


American 102.7 


  


 


 


BUN/Creatinine Ratio 17.1 (10-20)  


 


Calcium Level


  9.2 mg/dl


(8.5-10.1) 


 


 


C-Reactive Protein


  5.34 mg/dl


(0-0.29) 


 


 


Bedside Lactic Acid Venous


  


  1.04 mmol/L


(0.90-1.70)














Laboratory results as reviewed by me.





Medications Administered











 Medications


  (Trade)  Dose


 Ordered  Sig/Jailyn


 Route  Start Time


 Stop Time Status Last Admin


Dose Admin


 


 Ceftriaxone


 Sodium 2 gm  2 gm  NOW  STAT


 IV  2/27/17 11:14


 2/27/17 11:16 DC 2/27/17 11:50


2 GM


 


 Vancomycin HCl/


 Sodium Chloride


  (Vancomycin Inj/


 Nss 500ml)  540 ml @ 


 200 mls/hr  ONE  STAT


 IV  2/27/17 11:14


 2/27/17 13:55 DC 2/27/17 11:50


200 MLS/HR











ED Course


1103: The patient was evaluated in room C09. A complete history and physical 

exam was performed.





1114: Vancomycin HCl 2000 mg/Sodium Chloride 540 ml @ 200 mls/hr IV, Rocephin 

Inj 2 gm IV





1144: Upon reevaluation, the patient is resting comfortably. Discussed results 

and treatment plan with the patient.  He verbalized understanding and agreement 

with the treatment plan. [facility] The patient will be evaluated for further management.





Medical Decision


56 yr old male arrives with persistent fevers, body aches, headache, weakness 

and several pound weight loss over last week.  Does have cardiac murmur which 

apparently was recently noted and he was to see cards today actually.  Seen 

yesterday in ED with full septic workup done including LP which was negative.  

Both blood cultures from yesterday already positive for gram positive cocci.  

Will presume endocarditis until proven otherwise consistent with new murmur, 

symptoms and positive cultures.  No symptoms consistent with prostatitis thus 

hold off on rectal exam.  UA/CXR clear yesterday and no UTI/Cough symptoms.  

Stable and in no distress breathing comfortably.  Not in septic shock.  Started 

empiric abx and admit to medicine.





Consults


Time Called:  1142


Consulting Physician:  [facility]


Returned Call:  1144


I spoke with Dr. Mckee from [facility].





Impression





 Primary Impression:  


 Bacteremia





Scribe Attestation


The scribe's documentation has been prepared under my direction and personally 

reviewed by me in its entirety. I confirm that the note above accurately 

reflects all work, treatment, procedures, and medical decision making performed 

by me.





Departure Information


Dispostion


Being Evaluated By Hospitalist





Referrals


No Doctor, Assigned (PCP)





Patient Instructions


My [facility]

## 2017-02-27 NOTE — CARDIOLOGY CONSULTATION
Cardiology Consultation


Date of Consultation:


Feb 27, 2017.


Requesting Physician:


Dr. Alcantara


Reason for Consultation:


Positive blood cultures


Pt evaluation today including:  conversation w/ patient, physical exam, lab 

review, review of studies, review of inpatient medication list


History of Present Illness


This is a very pleasant 56-year-old gentleman who has a history of aortic 

insufficiency which has not been severe enough to require intervention in the 

past. He had dental work done about 2 weeks ago (cleaning), over the last week 

to 10 days he began to feel poorly with intermittent sweats, etc. and presented 

to urgent care with neck discomfort and headache yesterday. Due to the concern 

that this might be meningitis he was sent to the emergency room where blood 

cultures were done and a spinal tap was performed. The spinal tap was negative 

however the blood cultures were both positive for gram positives. He was called 

and asked to come back in today.





He has been on and off of antibiotics recently, so he feels fairly well 

currently. He does not have lightheadedness or dizziness, has not noticed a big 

difference in his exercise ability except that he has not been feeling well. He 

is to go to the gym regularly and has not done that over the last week or so. 

He does not have chest discomfort. He has no orthopnea or PND or peripheral 

edema.





Past Medical/Surgical History





(1) Septicemia


(2) Endocarditis


(3) Hyperlipidemia Nec/Nos


(4) Hypertension Nos


(5) Irritable Bowel Syndrome





Family History





FH: heart disease


FHx: gallbladder disease


Kidney disease


Kidney stones





Social History


Smoking Status:  Never Smoker





Review of Systems


Constitutional:  + see HPI, + sweats, No fever, No weakness, No weight loss


Respiratory:  No cough, No shortness of breath, No sputum, No wheezing


Cardiac:  No PND, No chest pain, No edema, No orthopnea, No palpitations


Abdomen:  No GI bleeding, No diarrhea, No nausea, No pain, No vomiting


Male :  No nocturia more than once/night, No sexual dysfunction, No slowing 

stream, No urinary frequency


Neurologic:  + problem reported (headache), No balance problems, No numbness/

tingling, No paralysis, No weakness


Heme:  No abnormal bleeding/bruising, No clotting problems


Endo:  No fatigue


Skin:  No problem reported


All Other Systems:  Reviewed and Negative





Allergies


Coded Allergies:  


     No Known Allergies (Unverified , 2/27/17)





Medications





 Current Inpatient Medications








 Medications


  (Trade)  Dose


 Ordered  Sig/Jailyn


 Route  Start Time


 Stop Time Status Last Admin


Dose Admin


 


 Acetaminophen


  (Tylenol Tab)  650 mg  Q4H  PRN


 PO  2/27/17 12:30


 3/29/17 12:29   


 


 


 Al Hydrox/Mg


 Hydrox/Simethicone


  (Maalox Max Susp)  15 ml  Q4H  PRN


 PO  2/27/17 12:30


 3/29/17 12:29   


 


 


 Magnesium


 Hydroxide


  (Milk Of


 Magnesia Susp)  30 ml  Q12H  PRN


 PO  2/27/17 12:30


 3/29/17 12:29   


 


 


 Zolpidem Tartrate


  (Ambien Tab)  5 mg  HSZ  PRN


 PO  2/27/17 12:30


 3/29/17 12:29   


 


 


 Nitroglycerin


  (Nitrostat Tab)  0.4 mg  UD  PRN


 SL  2/27/17 12:30


 3/29/17 12:29   


 


 


 Polyethylene


  (Miralax Powder


 Packet)  17 gm  DAILY  PRN


 PO  2/27/17 12:30


 3/29/17 12:29   


 











Physical Exam





 Vital Signs Past 12 Hours








  Date Time  Temp Pulse Resp B/P Pulse Ox O2 Delivery O2 Flow Rate FiO2


 


2/27/17 14:36 37.5 69 20 157/71 98 Room Air  


 


2/27/17 13:29  76 18 146/60 98   


 


2/27/17 13:11  70 18 146/60 97 Room Air  


 


2/27/17 12:39  73      


 


2/27/17 12:35  72 20 154/72 98 Room Air  


 


2/27/17 11:00  82 20 193/79 99 Room Air  








Constitutional:  


   General Apperance:  heathly-appearing, well-nourished, well-developed


   Level of Distress:  NAD


   Ambulation:  ambulating normally


Psychiatric:  


   Mental Status:  active & alert


Head:  normocephalic


Eyes:  


   EOM:  EOMI


ENMT:  normal ENT inspection, hearing grossly normal


Neck:  supple, no masses


Lungs:  


   Respiratory effort:  no dyspnea, good air movement


   Auscultation:  breath sounds normal, CTA except as noted, no wheezing, no 

rales/crackles, no rhonchi


Cardiovascular:  


   Heart Auscultation:  RRR, murmur (decrescendo diastolic murmur at the base)


Peripheral Pulses:  


   Bruits:  none appreciated


   Radial Pulse:  normal on the left, normal on the right


   Dorsalis Pedis Pulse:  normal on the left, normal on the right


Abdomen:  


   Bowel Sounds:  normal


   Inspection & Palpation:  soft, no tenderness, guarding & rebound, no masses


Musculoskeletal:  normal strength (5/5 throughout)


Extremities:  no cyanosis, no edema, no varicosities, no palpable cord, no 

clubbing, pertinent finding (Pt has a swollen and mildly tender right index 

finger.  FROM in the finger.  The nail on the 2nd R digit has signs of previous 

trauma.  Possible spinter hemorrhage on the R 5th nail.)


Neurologic:  


   Gait & Station:  normal gait, normal station


   Cranial Nerves:  grossly intact


   Sensation:  grossly intact





Data


Laboratory Results:





Last 24 Hours








Test


  2/27/17


11:38 2/27/17


11:39


 


White Blood Count 8.28 K/uL  


 


Red Blood Count 4.73 M/uL  


 


Hemoglobin 13.7 g/dL  


 


Hematocrit 40.1 %  


 


Mean Corpuscular Volume 84.8 fL  


 


Mean Corpuscular Hemoglobin 29.0 pg  


 


Mean Corpuscular Hemoglobin


Concent 34.2 g/dl 


  


 


 


Platelet Count 270 K/uL  


 


Mean Platelet Volume 10.4 fL  


 


Neutrophils (%) (Auto) 62.4 %  


 


Lymphocytes (%) (Auto) 27.9 %  


 


Monocytes (%) (Auto) 7.9 %  


 


Eosinophils (%) (Auto) 0.7 %  


 


Basophils (%) (Auto) 0.4 %  


 


Neutrophils # (Auto) 5.17 K/uL  


 


Lymphocytes # (Auto) 2.31 K/uL  


 


Monocytes # (Auto) 0.65 K/uL  


 


Eosinophils # (Auto) 0.06 K/uL  


 


Basophils # (Auto) 0.03 K/uL  


 


RDW Standard Deviation 40.2 fL  


 


RDW Coefficient of Variation 12.9 %  


 


Immature Granulocyte % (Auto) 0.7 %  


 


Immature Granulocyte # (Auto) 0.06 K/uL  


 


Erythrocyte Sedimentation Rate 40 mm/hr  


 


Sodium Level 135 mmol/L  


 


Potassium Level 3.9 mmol/L  


 


Chloride Level 99 mmol/L  


 


Carbon Dioxide Level 33 mmol/L  


 


Anion Gap 3.0 mmol/L  


 


Blood Urea Nitrogen 13 mg/dl  


 


Creatinine 0.76 mg/dl  


 


Est Creatinine Clear Calc


Drug Dose 120.6 ml/min 


  


 


 


Estimated GFR (


American) 119.0 


  


 


 


Estimated GFR (Non-


American 102.7 


  


 


 


BUN/Creatinine Ratio 17.1  


 


Random Glucose 87 mg/dl  


 


Calcium Level 9.2 mg/dl  


 


C-Reactive Protein 5.34 mg/dl  


 


Bedside Lactic Acid Venous  1.04 mmol/L 








Imaging: Echo interpretation pending





EKG: Sinus rhythm, anterolateral T-wave inversion.





Telemetry reviewed:  Sinus rhythm, no significant ectopy.





Assessment & Plan


#1. Positive blood cultures: This is very worrisome in that is likely this does 

represent endocarditis, he had dental work done about 2 weeks ago and has 

classic symptoms for endocarditis. He has an abnormal valve start with (aortic 

insufficiency) and this will need to be evaluated.





#2. Aortic insufficiency: He has a loud murmur, but we will need echocardiogram 

to see if his aortic insufficiency has changed and to see if there are any 

obvious vegetations. If the transthoracic echo is negative for vegetations out 

recommended transesophageal echocardiogram and I will make him nothing by mouth 

for tomorrow in case we need to do that. I discussed this with him.





Thank you for allowing me to participate in his care.

## 2017-02-27 NOTE — PHARMACY PROGRESS NOTE
Pharmacy Antibiotic Consult


Date of Service:


Feb 27, 2017.


Pharmacy Dosing Scope


Pharmacy is consulted to initiate Vancomycin IV dosing therapy, order 

appropriate labs and adjust drug dose/frequency.





Subjective


The patient is a 56 year old male admitted on Feb 27, 2017 at 12:35 with 

possible endocarditis.  Patient was in the ED yesterday where blood cultures 

where drawn and both returned positive for gram positive cocci.  Patient is at 

risk for endocarditis given his heart murmur and recent dental work without 

prophylactic abx.





Objective


Height (Feet):  6


Height (Inches):  0


Weight (Kilograms):  81.200


Lab Results (24hrs):





Laboratory Tests








Test


  2/27/17


11:38


 


BUN/Creatinine Ratio 17.1 


 


Blood Urea Nitrogen 13 mg/dl 


 


Creatinine 0.76 mg/dl 


 


White Blood Count 8.28 K/uL 


 


Red Blood Count 4.73 M/uL 


 


Hemoglobin 13.7 g/dL 


 


Hematocrit 40.1 % 


 


Mean Corpuscular Volume 84.8 fL 


 


Mean Corpuscular Hemoglobin 29.0 pg 


 


Mean Corpuscular Hemoglobin


Concent 34.2 g/dl 


 


 


Platelet Count 270 K/uL 


 


Mean Platelet Volume 10.4 fL 


 


Neutrophils (%) (Auto) 62.4 % 


 


Lymphocytes (%) (Auto) 27.9 % 


 


Monocytes (%) (Auto) 7.9 % 


 


Eosinophils (%) (Auto) 0.7 % 


 


Basophils (%) (Auto) 0.4 % 


 


Neutrophils # (Auto) 5.17 K/uL 


 


Lymphocytes # (Auto) 2.31 K/uL 


 


Monocytes # (Auto) 0.65 K/uL 


 


Eosinophils # (Auto) 0.06 K/uL 


 


Basophils # (Auto) 0.03 K/uL 








Micro Results:


In addition both blood cultures from yesterday in the E.D resulted positive for 

GPC











Item Value  Date Time


 


Blood Culture Received 2/27/17 1559





Blood Pending 


 


Blood Culture Received 2/27/17 1557





Blood Pending 











Recent Pertinent Medications











Item Value  Date Time


 


Ceftriaxone 70 ml @ 100 mls/hr 2/28/17 1200





Sodium 2000 mg/ Q24H/IV 





Dextrose  











Assessment & Plan


Loading dose: Vancomycin 2000mg IV (25mg/kg)  X 1 dose then will start 

Vancomycin 1300mg (~16mg/kg) IV every 8 hours.  I estimated patients half life 

at around 6.6 hours.  Will check a trough level prior to 0600 dose on 3/1/17.





Goal peak level estimate:  between 15-20 mcg/mL.





Pharmacy will continue to follow and will adjust dose/frequency as necessary.  

Thank you

## 2017-02-27 NOTE — PROGRESS NOTE
Progress Note


Date of Service


Feb 27, 2017.





Progress Note


ID Consult dictated #[id number]





A/P:





1. GPC septicemia, AR and recent dental work, concerning for IE





-continue abx, follow cultures, await ID of gpc. repeat cultures x 2


-echo and mri pending


-csf studies negative


-will follow, thank you

## 2017-02-28 VITALS — SYSTOLIC BLOOD PRESSURE: 144 MMHG | DIASTOLIC BLOOD PRESSURE: 52 MMHG | HEART RATE: 71 BPM | OXYGEN SATURATION: 99 %

## 2017-02-28 VITALS
TEMPERATURE: 98.24 F | OXYGEN SATURATION: 96 % | HEART RATE: 66 BPM | SYSTOLIC BLOOD PRESSURE: 142 MMHG | DIASTOLIC BLOOD PRESSURE: 61 MMHG

## 2017-02-28 VITALS
HEART RATE: 62 BPM | OXYGEN SATURATION: 98 % | TEMPERATURE: 98.24 F | SYSTOLIC BLOOD PRESSURE: 157 MMHG | DIASTOLIC BLOOD PRESSURE: 68 MMHG

## 2017-02-28 VITALS
DIASTOLIC BLOOD PRESSURE: 59 MMHG | HEART RATE: 76 BPM | OXYGEN SATURATION: 98 % | SYSTOLIC BLOOD PRESSURE: 130 MMHG | TEMPERATURE: 97.88 F

## 2017-02-28 VITALS — HEART RATE: 65 BPM | OXYGEN SATURATION: 100 % | SYSTOLIC BLOOD PRESSURE: 139 MMHG | DIASTOLIC BLOOD PRESSURE: 37 MMHG

## 2017-02-28 VITALS — DIASTOLIC BLOOD PRESSURE: 41 MMHG | HEART RATE: 63 BPM | OXYGEN SATURATION: 95 % | SYSTOLIC BLOOD PRESSURE: 131 MMHG

## 2017-02-28 VITALS — HEART RATE: 71 BPM | DIASTOLIC BLOOD PRESSURE: 58 MMHG | SYSTOLIC BLOOD PRESSURE: 117 MMHG | OXYGEN SATURATION: 98 %

## 2017-02-28 VITALS
TEMPERATURE: 98.78 F | DIASTOLIC BLOOD PRESSURE: 60 MMHG | HEART RATE: 69 BPM | OXYGEN SATURATION: 96 % | SYSTOLIC BLOOD PRESSURE: 128 MMHG

## 2017-02-28 VITALS — SYSTOLIC BLOOD PRESSURE: 153 MMHG | HEART RATE: 70 BPM | OXYGEN SATURATION: 99 % | DIASTOLIC BLOOD PRESSURE: 49 MMHG

## 2017-02-28 VITALS
OXYGEN SATURATION: 97 % | TEMPERATURE: 98.06 F | SYSTOLIC BLOOD PRESSURE: 131 MMHG | HEART RATE: 63 BPM | DIASTOLIC BLOOD PRESSURE: 61 MMHG

## 2017-02-28 VITALS
SYSTOLIC BLOOD PRESSURE: 130 MMHG | TEMPERATURE: 98.78 F | OXYGEN SATURATION: 98 % | DIASTOLIC BLOOD PRESSURE: 53 MMHG | HEART RATE: 72 BPM

## 2017-02-28 VITALS — DIASTOLIC BLOOD PRESSURE: 48 MMHG | HEART RATE: 67 BPM | SYSTOLIC BLOOD PRESSURE: 144 MMHG | OXYGEN SATURATION: 93 %

## 2017-02-28 VITALS
DIASTOLIC BLOOD PRESSURE: 65 MMHG | SYSTOLIC BLOOD PRESSURE: 138 MMHG | HEART RATE: 63 BPM | OXYGEN SATURATION: 96 % | TEMPERATURE: 98.6 F

## 2017-02-28 VITALS — OXYGEN SATURATION: 98 % | HEART RATE: 70 BPM | SYSTOLIC BLOOD PRESSURE: 137 MMHG | DIASTOLIC BLOOD PRESSURE: 36 MMHG

## 2017-02-28 VITALS — DIASTOLIC BLOOD PRESSURE: 47 MMHG | HEART RATE: 62 BPM | SYSTOLIC BLOOD PRESSURE: 128 MMHG | OXYGEN SATURATION: 92 %

## 2017-02-28 VITALS
HEART RATE: 62 BPM | TEMPERATURE: 98.24 F | SYSTOLIC BLOOD PRESSURE: 157 MMHG | DIASTOLIC BLOOD PRESSURE: 68 MMHG | OXYGEN SATURATION: 98 %

## 2017-02-28 VITALS — OXYGEN SATURATION: 96 %

## 2017-02-28 VITALS — OXYGEN SATURATION: 98 %

## 2017-02-28 LAB
ALP SERPL-CCNC: 141 U/L (ref 45–117)
ALT SERPL-CCNC: 70 U/L (ref 12–78)
ANION GAP SERPL CALC-SCNC: 8 MMOL/L (ref 3–11)
AST SERPL-CCNC: 20 U/L (ref 15–37)
BASOPHILS # BLD: 0.04 K/UL (ref 0–0.2)
BASOPHILS NFR BLD: 0.5 %
BUN SERPL-MCNC: 13 MG/DL (ref 7–18)
BUN/CREAT SERPL: 16.5 (ref 10–20)
CALCIUM SERPL-MCNC: 8.9 MG/DL (ref 8.5–10.1)
CHLORIDE SERPL-SCNC: 103 MMOL/L (ref 98–107)
CO2 SERPL-SCNC: 28 MMOL/L (ref 21–32)
COMPLETE: YES
CREAT CL PREDICTED SERPL C-G-VRATE: 119 ML/MIN
CREAT SERPL-MCNC: 0.77 MG/DL (ref 0.6–1.4)
CRP SERPL-MCNC: 4.11 MG/DL (ref 0–0.29)
EOSINOPHIL NFR BLD AUTO: 269 K/UL (ref 130–400)
GLUCOSE SERPL-MCNC: 95 MG/DL (ref 70–99)
HCT VFR BLD CALC: 38.4 % (ref 42–52)
IG%: 0.6 %
IMM GRANULOCYTES NFR BLD AUTO: 31.4 %
LYMPHOCYTES # BLD: 2.69 K/UL (ref 1.2–3.4)
MCH RBC QN AUTO: 28.7 PG (ref 25–34)
MCHC RBC AUTO-ENTMCNC: 34.1 G/DL (ref 32–36)
MCV RBC AUTO: 84.2 FL (ref 80–100)
MONOCYTES NFR BLD: 9 %
NEUTROPHILS # BLD AUTO: 0.8 %
NEUTROPHILS NFR BLD AUTO: 57.7 %
PMV BLD AUTO: 10.1 FL (ref 7.4–10.4)
POTASSIUM SERPL-SCNC: 4.4 MMOL/L (ref 3.5–5.1)
RBC # BLD AUTO: 4.56 M/UL (ref 4.7–6.1)
RHEUMATOID FACT FLD-ACNC: < 10 U/ML (ref 0–15)
SODIUM SERPL-SCNC: 139 MMOL/L (ref 136–145)
WBC # BLD AUTO: 8.56 K/UL (ref 4.8–10.8)

## 2017-02-28 RX ADMIN — CEFTRIAXONE SCH MLS/HR: 2 INJECTION, POWDER, FOR SOLUTION INTRAMUSCULAR; INTRAVENOUS at 11:46

## 2017-02-28 RX ADMIN — VANCOMYCIN HYDROCHLORIDE SCH MLS/HR: 1 INJECTION, POWDER, LYOPHILIZED, FOR SOLUTION INTRAVENOUS at 14:09

## 2017-02-28 RX ADMIN — VANCOMYCIN HYDROCHLORIDE SCH MLS/HR: 1 INJECTION, POWDER, LYOPHILIZED, FOR SOLUTION INTRAVENOUS at 22:25

## 2017-02-28 RX ADMIN — ACETAMINOPHEN PRN MG: 325 TABLET ORAL at 08:35

## 2017-02-28 RX ADMIN — VANCOMYCIN HYDROCHLORIDE SCH MLS/HR: 1 INJECTION, POWDER, LYOPHILIZED, FOR SOLUTION INTRAVENOUS at 06:06

## 2017-02-28 NOTE — CARDIOLOGY PROGRESS NOTE
DATE: 02/28/2017

 

SUBJECTIVE:  Mr. Beckett is resting comfortably in bedside chair without

complaints of chest pain, dyspnea, fevers, or chills.  Results of his blood

cultures and echocardiogram discussed in detail.  We have also discussed the

need for transesophageal echocardiogram.  The patient voices an understanding

and agrees to proceed.

 

OBJECTIVE:

VITAL SIGNS:  Blood pressure is 140/60 with a regular pulse of 66. 

Respiratory rate is 18.  The patient is afebrile at 36.8 degrees Celsius. 

Saturations 96% on room air.

NECK:  Supple with full carotid upstrokes.  There are no carotid bruits. 

Jugular venous pressure is flat at 90 degrees.  There is no thyromegaly.

CARDIOVASCULAR:  Reveals a regular rhythm with a 3/6 diastolic decrescendo

murmur heard across the entire precordium, but loudest in the aortic region. 

No S3.

LUNGS:  Clear without rales, rhonchi, or wheezes.

ABDOMEN:  Benign without bruits.

EXTREMITIES:  Reveal intact radial artery pulses bilaterally.  No evidence of

splinter hemorrhages.  There is no peripheral edema.

 

DATA:  CBC notes a hemoglobin of 13.1, hematocrit 38.4, white count 8.5,

platelet count 269,000.  Electrolytes note a sodium of 139, potassium 4.4,

chloride 103, bicarbonate 20, BUN 13, creatinine 0.7, glucose 95.  C-reactive

protein elevated at 4.1.  Blood cultures from the 27th are pending.  Blood

cultures from February 26th note an alpha strep.  Echocardiogram interpreted

yesterday by Dr. Lainez notes a moderately dilated left ventricle with low

normal ejection fraction of 50% to 55%.  There is moderate to severe aortic

insufficiency without obvious vegetation.  Telemetry monitor is benign.

 

IMPRESSION AND PLAN:

1.  Presumed endocarditis -- two positive blood cultures with an alpha strep

species.  The patient has significant aortic insufficiency.  We will proceed

with a transesophageal echocardiogram later today to rule out abscess

formation and a vegetation.  Obviously, the patient will require long-term

antibiotics.  Infectious Disease is on the case.

2.  Known significant aortic insufficiency -- has been followed with routine

echocardiograms as an outpatient.  Echocardiogram done in early January noted

normal left ventricular systolic function with an ejection fraction of 60% to

65%.  Left ventricle is mildly dilated and there was evidence of at least

moderate aortic insufficiency.  This was an eccentric jet.  That study was

unchanged from when done in the summer of 2016.

## 2017-02-28 NOTE — PROGRESS NOTE
Subjective


Date of Service:


Feb 28, 2017.


Subjective


initial blood cultures from Er visit on 2/26 with alpha strep, not 

enterococcus. csf cultures remain negative. afebrile. mri c spine negative for 

epidural disease. wbc nml today. for bridget today. no overnight events. on broad 

spectrum abx, tolerating well.





Problem List


Medical Problems:


(1) Bacteremia


Status: Acute  





(2) Body aches


Status: Acute  





(3) Headache


Status: Acute  





(4) Neck pain


Status: Acute  











Objective


Vital Signs











  Date Time  Temp Pulse Resp B/P Pulse Ox O2 Delivery O2 Flow Rate FiO2


 


2/28/17 14:07 36.7 63 18 131/61 97 Room Air  


 


2/28/17 13:15  63 21 131/41 95 Room Air  


 


2/28/17 13:04  67 20 144/48 93 Room Air  


 


2/28/17 12:54  62 19 128/47 92 Room Air  


 


2/28/17 12:45  71 15 117/58 98 Nasal Cannula 2.0 


 


2/28/17 12:43      Nasal Cannula 2.0 


 


2/28/17 12:40  70 19 153/49 99 Nasal Cannula 2.0 


 


2/28/17 12:35  71 11 144/52 99 Nasal Cannula 2.0 


 


2/28/17 12:30  70 17 137/36 98 Nasal Cannula 2.0 


 


2/28/17 12:26  65 15 139/37 100 Nasal Cannula 2.0 


 


2/28/17 11:45 36.8 62 16 157/68 98   


 


2/28/17 11:45  62 16 158/49 98 Room Air  


 


2/28/17 11:41 36.8 62 16 157/68 98 Room Air  


 


2/28/17 08:39 36.8 66 18 142/61 96   


 


2/28/17 08:00     96 Room Air  


 


2/28/17 04:38 37.0 63 20 138/65 96 Room Air  


 


2/28/17 04:00      Room Air  


 


2/28/17 00:01      Room Air  


 


2/27/17 23:41 37.0 68 20 127/61 96 Room Air  


 


2/27/17 20:00      Room Air  


 


2/27/17 19:27 36.9 78 18 148/68 97 Room Air  


 


2/27/17 16:24 37.1 72 22 163/69 97 Room Air  


 


2/27/17 16:00     98 Room Air  


 


2/27/17 14:36 37.5 69 20 157/71 98 Room Air  


 


2/27/17 14:30 37.5  20 157/71 98 Room Air  











Laboratory Results





Last 24 Hours








Test


  2/28/17


06:15


 


White Blood Count 8.56 K/uL 


 


Red Blood Count 4.56 M/uL 


 


Hemoglobin 13.1 g/dL 


 


Hematocrit 38.4 % 


 


Mean Corpuscular Volume 84.2 fL 


 


Mean Corpuscular Hemoglobin 28.7 pg 


 


Mean Corpuscular Hemoglobin


Concent 34.1 g/dl 


 


 


Platelet Count 269 K/uL 


 


Mean Platelet Volume 10.1 fL 


 


Neutrophils (%) (Auto) 57.7 % 


 


Lymphocytes (%) (Auto) 31.4 % 


 


Monocytes (%) (Auto) 9.0 % 


 


Eosinophils (%) (Auto) 0.8 % 


 


Basophils (%) (Auto) 0.5 % 


 


Neutrophils # (Auto) 4.94 K/uL 


 


Lymphocytes # (Auto) 2.69 K/uL 


 


Monocytes # (Auto) 0.77 K/uL 


 


Eosinophils # (Auto) 0.07 K/uL 


 


Basophils # (Auto) 0.04 K/uL 


 


RDW Standard Deviation 39.7 fL 


 


RDW Coefficient of Variation 13.0 % 


 


Immature Granulocyte % (Auto) 0.6 % 


 


Immature Granulocyte # (Auto) 0.05 K/uL 


 


Erythrocyte Sedimentation Rate 34 mm/hr 


 


Sodium Level 139 mmol/L 


 


Potassium Level 4.4 mmol/L 


 


Chloride Level 103 mmol/L 


 


Carbon Dioxide Level 28 mmol/L 


 


Anion Gap 8.0 mmol/L 


 


Blood Urea Nitrogen 13 mg/dl 


 


Creatinine 0.77 mg/dl 


 


Est Creatinine Clear Calc


Drug Dose 119.0 ml/min 


 


 


Estimated GFR (


American) 118.4 


 


 


Estimated GFR (Non-


American 102.2 


 


 


BUN/Creatinine Ratio 16.5 


 


Random Glucose 95 mg/dl 


 


Calcium Level 8.9 mg/dl 


 


Total Bilirubin 0.5 mg/dl 


 


Direct Bilirubin 0.1 mg/dl 


 


Aspartate Amino Transf


(AST/SGOT) 20 U/L 


 


 


Alanine Aminotransferase


(ALT/SGPT) 70 U/L 


 


 


Alkaline Phosphatase 141 U/L 


 


C-Reactive Protein 4.11 mg/dl 


 


Total Protein 6.9 gm/dl 


 


Albumin 3.3 gm/dl 


 


Rheumatoid Factor < 10.0 U/mL 


 


Hepatitis C Antibody NEG 











Assessment and Plan





(1) Septicemia


Assessment & Plan:  remain concerned for IE, especially with recent dental 

work. of note, elbow culture from 9/2015 with alpha strep as well. will 

continue broad spectrum abx pending the results of his BRIDGET and repeat blood 

cultures which are currently pending. 





Continued [facility] stay due to:  multiple IV medications needed, other (pending 

studies)


Discharge planning:  home with IV medication

## 2017-02-28 NOTE — PROGRESS NOTE
Subjective


Date of Service:


Feb 28, 2017.


Subjective


Pt evaluation today including:  conversation w/ patient, physical exam, chart 

review, lab review, review of studies, conversation w/ consultant, review of 

inpatient medication list


Pain:  right-sided neck pain, improved


PO Intake:  nothing by mouth pending DEIDRE


Voiding:  no voiding problems


56-year-old male admitted yesterday with 2 out of 2 positive blood cultures and 

clinical history and physical exam findings suspicious for bacterial 

endocarditis.  The patient had a transthoracic echocardiogram yesterday which 

did not reveal any vegetations; he is scheduled for a transesophageal 

echocardiogram later today.





The patient was seen in consultation by both cardiology and infectious disease.

  The consultations are reviewed and appreciated.





The patient remains on ceftriaxone 2 g IV every 24 hours and vancomycin dosed 

per pharmacy.





The patient notes continued right sided neck pain although it is better when 

compared to yesterday.  He underwent an MRI of the cervical spine last evening 

which was negative for infectious findings although it did note some mild to 

moderate recently known cervical stenosis.





Problem List


Medical Problems:


(1) Bacteremia


Status: Acute  





(2) Body aches


Status: Acute  





(3) Headache


Status: Acute  





(4) Neck pain


Status: Acute  











Review of Systems


Constitutional:  No chills, No fever


Eyes:  No problem reported


ENT:  No problem reported


Respiratory:  No cough, No shortness of breath, No wheezing


Cardiac:  No chest pain, No orthopnea


Abdomen:  No problem reported


Musculoskeletal:  + muscle pain


Male :  No problem reported


Neurologic:  No numbness/tingling, No weakness


Psychiatric:  No problem reported


Heme:  No problem reported


Endo:  No problem reported


All Other Systems:  Reviewed and Negative





Medications





 Acetaminophen 650 mg 650 mg Q4H  PRN PO Last administered on 2/28/17at 08:35; 

Admin Dose 650 MG;  Start 2/27/17 at 12:30;  Stop 3/29/17 at 12:29


Vancomycin HCl/ Sodium Chloride (Vancomycin Inj/ Nss 250ml) 276 ml @  125 mls/

hr Q8H IV Last administered on 2/28/17at 06:06; Admin Dose 125 MLS/HR;  Start 2/ 27/17 at 22:00;  Stop 4/10/17 at 21:59





Objective


Vital Signs











  Date Time  Temp Pulse Resp B/P Pulse Ox O2 Delivery O2 Flow Rate FiO2


 


2/28/17 08:39 36.8 66 18 142/61 96   


 


2/28/17 04:38 37.0 63 20 138/65 96 Room Air  


 


2/28/17 04:00      Room Air  


 


2/28/17 00:01      Room Air  


 


2/27/17 23:41 37.0 68 20 127/61 96 Room Air  


 


2/27/17 20:00      Room Air  


 


2/27/17 19:27 36.9 78 18 148/68 97 Room Air  


 


2/27/17 16:24 37.1 72 22 163/69 97 Room Air  


 


2/27/17 16:00     98 Room Air  


 


2/27/17 14:36 37.5 69 20 157/71 98 Room Air  


 


2/27/17 14:30 37.5  20 157/71 98 Room Air  


 


2/27/17 13:29  76 18 146/60 98   


 


2/27/17 13:11  70 18 146/60 97 Room Air  


 


2/27/17 12:39  73      


 


2/27/17 12:35  72 20 154/72 98 Room Air  


 


2/27/17 11:00  82 20 193/79 99 Room Air  











Physical Exam


General Appearance:  WD/WN, no apparent distress


Eyes:  normal inspection, PERRL, EOMI


ENT:  normal ENT inspection, hearing grossly normal


Neck:  supple, no adenopathy, thyroid normal, no JVD, no carotid bruits, 

trachea midline, + pertinent finding (he has some neck discomfort with right 

cervical rotation.  Flexion and extension is unaffected.  On palpation there is 

no midline cervical spine tenderness.  Inspection of the skin overlying the 

areas unremarkable.  Palpation of the suboccipital muscles reveal slight spasm 

but no focal tenderness.)


Respiratory/Chest:  chest non-tender, lungs clear, normal breath sounds, no 

respiratory distress, no accessory muscle use


Cardiovascular:  regular rate, rhythm, no edema, no gallop, no JVD, + diastolic 

murmur (grade 3 over 4 right sternal border), + systolic murmur (2/6 left lower 

sternal border)


Abdomen:  normal bowel sounds, non tender


Extremities:  normal range of motion, non-tender


Neurologic/Psychiatric:  no motor/sensory deficits, alert, normal mood/affect, 

oriented x 3


Skin:  normal color, warm/dry, no rash, + pertinent finding (no significant 

nail findings)





Laboratory Results





Last 24 Hours








Test


  2/27/17


11:38 2/27/17


11:39 2/28/17


06:15


 


White Blood Count 8.28 K/uL   8.56 K/uL 


 


Red Blood Count 4.73 M/uL   4.56 M/uL 


 


Hemoglobin 13.7 g/dL   13.1 g/dL 


 


Hematocrit 40.1 %   38.4 % 


 


Mean Corpuscular Volume 84.8 fL   84.2 fL 


 


Mean Corpuscular Hemoglobin 29.0 pg   28.7 pg 


 


Mean Corpuscular Hemoglobin


Concent 34.2 g/dl 


  


  34.1 g/dl 


 


 


Platelet Count 270 K/uL   269 K/uL 


 


Mean Platelet Volume 10.4 fL   10.1 fL 


 


Neutrophils (%) (Auto) 62.4 %   57.7 % 


 


Lymphocytes (%) (Auto) 27.9 %   31.4 % 


 


Monocytes (%) (Auto) 7.9 %   9.0 % 


 


Eosinophils (%) (Auto) 0.7 %   0.8 % 


 


Basophils (%) (Auto) 0.4 %   0.5 % 


 


Neutrophils # (Auto) 5.17 K/uL   4.94 K/uL 


 


Lymphocytes # (Auto) 2.31 K/uL   2.69 K/uL 


 


Monocytes # (Auto) 0.65 K/uL   0.77 K/uL 


 


Eosinophils # (Auto) 0.06 K/uL   0.07 K/uL 


 


Basophils # (Auto) 0.03 K/uL   0.04 K/uL 


 


RDW Standard Deviation 40.2 fL   39.7 fL 


 


RDW Coefficient of Variation 12.9 %   13.0 % 


 


Immature Granulocyte % (Auto) 0.7 %   0.6 % 


 


Immature Granulocyte # (Auto) 0.06 K/uL   0.05 K/uL 


 


Erythrocyte Sedimentation Rate 40 mm/hr   34 mm/hr 


 


Sodium Level 135 mmol/L   139 mmol/L 


 


Potassium Level 3.9 mmol/L   4.4 mmol/L 


 


Chloride Level 99 mmol/L   103 mmol/L 


 


Carbon Dioxide Level 33 mmol/L   28 mmol/L 


 


Anion Gap 3.0 mmol/L   8.0 mmol/L 


 


Blood Urea Nitrogen 13 mg/dl   13 mg/dl 


 


Creatinine 0.76 mg/dl   0.77 mg/dl 


 


Est Creatinine Clear Calc


Drug Dose 120.6 ml/min 


  


  119.0 ml/min 


 


 


Estimated GFR (


American) 119.0 


  


  118.4 


 


 


Estimated GFR (Non-


American 102.7 


  


  102.2 


 


 


BUN/Creatinine Ratio 17.1   16.5 


 


Random Glucose 87 mg/dl   95 mg/dl 


 


Calcium Level 9.2 mg/dl   8.9 mg/dl 


 


C-Reactive Protein 5.34 mg/dl   4.11 mg/dl 


 


Bedside Lactic Acid Venous  1.04 mmol/L  


 


Total Bilirubin   0.5 mg/dl 


 


Direct Bilirubin   0.1 mg/dl 


 


Aspartate Amino Transf


(AST/SGOT) 


  


  20 U/L 


 


 


Alanine Aminotransferase


(ALT/SGPT) 


  


  70 U/L 


 


 


Alkaline Phosphatase   141 U/L 


 


Total Protein   6.9 gm/dl 


 


Albumin   3.3 gm/dl 


 


Rheumatoid Factor   < 10.0 U/mL 











Assessment and Plan





56 year old male with prostate 10 day history of generalized illness, fatigue, 

and chills in the setting of positive blood cultures for gram-positive cocci 

and a pronounced diastolic cardiac murmur consistent with aortic regurgitation. 





1 rule out bacterial endocarditis


2 elevated blood pressure without previous diagnosis of hypertension


3 right sided neck pain, normal cervical spine MRI


4 elevated alkaline phosphatase of uncertain significance





PLAN


1 transesophageal echocardiogram as scheduled for today


2 continue ceftriaxone and vancomycin


3 fractionate alkaline phosphatase


4 father disposition will depend on results of today's DEIDRE


Continued [facility] stay due to:  multiple IV medications needed, other (pending 

studies)


Discharge planning:  home with IV medication

## 2017-02-28 NOTE — TEE
*NOTICE TO RECEIVING PARTY AGENCY**  This information is strictly Confidential and protected under 
[place] law.  [place] law prohibits you from making any further disclosure of this 
information unless further disclosure is expressly permitted by the written consent of the person to 
whom it pertains or is authorized by law.  A general authorization for the release of medical or 
other information is not sufficient for this purpose.  Hospital accepts no responsibility if the 
information is made available to any other person, INCLUDING THE PATIENT.



Interpretation Summary

  *  Name: KANA MUNOZ  Study Date: 2017 11:56 AM  BP: 139/37 mmHg

  *  MRN: [id number]  Patient Location: .2T\S\S243\S\1  HR: 68

  *  : 1960 (M/d/yyyy)  Gender: Male  Height: 72 in

  *  Age: 56 yrs  Ethnicity: [place]  Weight: 174 lb

  *  Ordering Physician: Martín Bartlett

  *  Referring Physician: Self, Referred

  *  Performed By: MICHELE Benavides RCS

  *  Accession# [id number]  Account# [id number]

  *  Reason For Study: Eval for Endocarditis

  *  BSA: 2.0 m2

  *  -- Conclusions --

  *  Large, mobile vegetation involving the aortic valve with some prolapse of the valve resulting 
in severe regurgitation

Procedure Details

  *  DEIDRE Probe #1 utilized for procedure.

  *  The study was performed in Cardiac Catheterization Lab.

  *  Time out was conducted by the physician, nurse, and echo tech with positive identification of 
patient and procedure.

  *  Informed consent for Transesophageal Echocardiogram was obtained prior to the procedure.

  *  An intravenous line was placed. A topical anesthetic agent was used for oropharangeal 
anesthesia. A bite block was inserted.

  *  The patient's vital signs, including blood pressure, heart rate, pulse oximetry and cardiac 
rhythm were monitored throughout the procedure .

  *  Fentanyl 50 mcg was administered for procedural sedation.

  *  Midazolam 3 mg administered for sedation.

  *  A multifrequency, multiplane transesopheageal echocardiographic endoscope was inserted and 
manipulated in the standard fashion to achieve multiplane views.

  *  The transesophageal probe was passed without difficulty.

  *  Contrast injection with agitated saline was performed.

  *  The patient tolerated the procedure well without evidence of orophangeal or esophageal trauma.

  *  A 2D transesophageal echocardiogram was performed.

  *  A 2D transesophageal echocardiogram with color flow Doppler was performed.

  *  A 2D transesophageal echocardiogram with Doppler and color flow Doppler was performed.

Left Ventricle

  *  The left ventricular ejection fraction is grossly normal.

Atria

  *  Injection of contrast documented no interatrial shunt.

Mitral Valve

  *  The mitral valve is normal.

  *  There is trace mitral regurgitation.

Tricuspid Valve

  *  The tricuspid valve is not well visualized, but is grossly normal.

Aortic Valve

  *  The aortic valve is trileaflet.

  *  An abscess cavity is not identified.

  *  There is a large vegetation or mass on the aortic valve.

  *  Large, mobile vegetation involving the aortic valve with some prolapse of the valve resulting 
in severe regurgitation

  *  Severe aortic regurgitation.

  *  There is an eccentric jet of aortic insufficiency directed against the anterior mitral leaflet.

Great Vessels

  *  The aortic root and proximal ascending aorta are normal sized.

Pericardium

  *  There is no pericardial effusion.

## 2017-03-01 VITALS
HEART RATE: 79 BPM | SYSTOLIC BLOOD PRESSURE: 169 MMHG | DIASTOLIC BLOOD PRESSURE: 55 MMHG | OXYGEN SATURATION: 96 % | TEMPERATURE: 98.42 F

## 2017-03-01 VITALS
DIASTOLIC BLOOD PRESSURE: 55 MMHG | OXYGEN SATURATION: 96 % | SYSTOLIC BLOOD PRESSURE: 169 MMHG | HEART RATE: 79 BPM | TEMPERATURE: 98.42 F

## 2017-03-01 VITALS
DIASTOLIC BLOOD PRESSURE: 66 MMHG | HEART RATE: 64 BPM | TEMPERATURE: 98.24 F | OXYGEN SATURATION: 97 % | SYSTOLIC BLOOD PRESSURE: 131 MMHG

## 2017-03-01 VITALS
OXYGEN SATURATION: 96 % | HEART RATE: 75 BPM | SYSTOLIC BLOOD PRESSURE: 163 MMHG | TEMPERATURE: 98.42 F | DIASTOLIC BLOOD PRESSURE: 62 MMHG

## 2017-03-01 VITALS
DIASTOLIC BLOOD PRESSURE: 70 MMHG | TEMPERATURE: 98.6 F | HEART RATE: 64 BPM | OXYGEN SATURATION: 97 % | SYSTOLIC BLOOD PRESSURE: 124 MMHG

## 2017-03-01 LAB
ALP SERPL-CCNC: 132 U/L (ref 45–117)
ALT SERPL-CCNC: 56 U/L (ref 12–78)
ANION GAP SERPL CALC-SCNC: 7 MMOL/L (ref 3–11)
AST SERPL-CCNC: 19 U/L (ref 15–37)
BASOPHILS # BLD: 0.04 K/UL (ref 0–0.2)
BASOPHILS NFR BLD: 0.5 %
BUN SERPL-MCNC: 12 MG/DL (ref 7–18)
BUN/CREAT SERPL: 15.4 (ref 10–20)
CALCIUM SERPL-MCNC: 8.8 MG/DL (ref 8.5–10.1)
CHLORIDE SERPL-SCNC: 102 MMOL/L (ref 98–107)
CO2 SERPL-SCNC: 30 MMOL/L (ref 21–32)
COMPLETE: YES
CREAT CL PREDICTED SERPL C-G-VRATE: 114.5 ML/MIN
CREAT SERPL-MCNC: 0.8 MG/DL (ref 0.6–1.4)
CRP SERPL-MCNC: 2.94 MG/DL (ref 0–0.29)
EOSINOPHIL NFR BLD AUTO: 302 K/UL (ref 130–400)
GLUCOSE SERPL-MCNC: 90 MG/DL (ref 70–99)
HCT VFR BLD CALC: 39 % (ref 42–52)
IG%: 0.6 %
IMM GRANULOCYTES NFR BLD AUTO: 27.7 %
LYMPHOCYTES # BLD: 2.27 K/UL (ref 1.2–3.4)
MCH RBC QN AUTO: 28.7 PG (ref 25–34)
MCHC RBC AUTO-ENTMCNC: 34.1 G/DL (ref 32–36)
MCV RBC AUTO: 84.2 FL (ref 80–100)
MONOCYTES NFR BLD: 8.9 %
NEUTROPHILS # BLD AUTO: 1.2 %
NEUTROPHILS NFR BLD AUTO: 61.1 %
PMV BLD AUTO: 10.1 FL (ref 7.4–10.4)
POTASSIUM SERPL-SCNC: 4.4 MMOL/L (ref 3.5–5.1)
RBC # BLD AUTO: 4.63 M/UL (ref 4.7–6.1)
SODIUM SERPL-SCNC: 139 MMOL/L (ref 136–145)
WBC # BLD AUTO: 8.2 K/UL (ref 4.8–10.8)

## 2017-03-01 PROCEDURE — 02HV33Z INSERTION OF INFUSION DEVICE INTO SUPERIOR VENA CAVA, PERCUTANEOUS APPROACH: ICD-10-PCS | Performed by: FAMILY MEDICINE

## 2017-03-01 RX ADMIN — CEFTRIAXONE SCH MLS/HR: 2 INJECTION, POWDER, FOR SOLUTION INTRAMUSCULAR; INTRAVENOUS at 13:18

## 2017-03-01 RX ADMIN — VANCOMYCIN HYDROCHLORIDE SCH MLS/HR: 1 INJECTION, POWDER, LYOPHILIZED, FOR SOLUTION INTRAVENOUS at 05:38

## 2017-03-01 NOTE — CARDIOLOGY PROGRESS NOTE
DATE: 03/01/2017

 

DATE:  03/01/2017.  

 

SUBJECTIVE:  Mr. Beckett is resting comfortably in bedside chair without

complaints of chest pain, dyspnea, palpitations, fever, or chills.  Results

of his transesophageal echocardiogram reviewed in detail.  Dr. Alcantara also at

the bedside.

 

OBJECTIVE:

VITAL SIGNS:  Blood pressure is 130/65 with a regular pulse of 64. 

Respiratory rate is 18 and the patient is afebrile at 36.8 degrees Celsius. 

Saturation is 97% on room air.

NECK:  Supple with full carotid upstrokes.  There are no carotid bruits. 

Jugular venous pressure is flat at 90 degrees.  There is no thyromegaly.

CARDIOVASCULAR EXAMINATION:  Reveals a regular rhythm with normal S1 and S2. 

A 3/6 diastolic decrescendo murmur noted, loudest in the aortic region.  No

S3.

LUNGS:  Clear without rales, rhonchi or wheezes.

ABDOMEN:  Soft without bruits.

EXTREMITIES:  Reveal intact radial artery pulses bilaterally.  There is no

peripheral edema.  Extremities note intact radial artery pulses bilaterally. 

There is no peripheral edema.

 

LABORATORY DATA:  CBC notes a hemoglobin of 13.3, hematocrit 39.0, white

count 8.2, platelet count 302,000.  Electrolytes note a sodium of 139,

potassium 4.4, chloride 102, bicarbonate 30,  BUN 12, creatinine 0.8, glucose

90.  Telemetry monitor is benign.  A transesophageal echocardiogram notes a

large vegetation on the aortic valve with a degree of prolapse.  There is

severe asymmetric aortic insufficiency directed towards the anterior mitral

leaflet.  Left ventricular systolic function is normal.

 

IMPRESSION AND PLAN:

1. 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  Did have

a dental cleaning 2 weeks prior to presentation, which is the likely

etiology.  Sensitivities on the organism note a resistance to azithromycin

and erythromycin, otherwise pansensitive.  Antibiotic regimen per infectious

disease.

2. Severe aortic insufficiency -- will discuss the case with the

cardiothoracic surgeons at [facility] to determine timing of

valve replacement surgery.

## 2017-03-01 NOTE — PHARMACY PROGRESS NOTE
ED Pharmacist Culture FollowUp


Date of Service:


Mar 1, 2017.





Patient returned to hospital and was admitted 2/27.  Patient is still admitted 

and being followed by infectious disease.  Per ID note today, they are aware of 

the Strep mitis in the blood culture and are managing this patient's 

antibiotics.  No further intervention required by ED staff.

## 2017-03-01 NOTE — DISCHARGE SUMMARY
Discharge Summary


Date of Service


Mar 1, 2017.





Discharge Summary


Admission Date:


Feb 27, 2017 at 12:35


Discharge Date:  Mar 1, 2017


Discharge Disposition:  Home with services


Principal Diagnosis:  rule out bacterial endocarditis#2 aortic insufficiency


Immunizations:  


   Have You Had Influenza Vaccine:  Unknown


   History of Tetanus Vaccine?:  Unknown


   History of Pneumococcal:  No


   History of Hepatitis B Vaccine:  Unknown


Procedures:


#1 transthoracic echocardiogram


#2 transesophageal echocardiogram


Consultations:


#1 cardiology


#2 infectious disease





Medication Reconciliation


New Medications:  


Ceftriaxone Sod (Rocephin) 1 Gm Inj


2 GM IV Q24H for 42 Days, VIAL





 


Continued Medications:  


Aspirin (Aspir-81) 81 Mg Tab


1 TAB PO DAILY, 3 Refills





Atorvastatin (Lipitor) 10 Mg Tab


10 MG PO EVERY OTHER DAY, 0 Refills





 


Discontinued Medications:  


Ibuprofen (Advil) 200 Mg Tab


600 MG PO UD, TAB











Referrals At Discharge


Follow up Referrals:  


Cardiologist Referral - Within 1-2 Weeks @ [facility] with Tri Follow up


Infectious Disease - Within 1-2 Weeks @ [facility] with 

Ishan Follow Up








Discharge Exam


Review of Systems:  


   Constitutional:  No chills, No fever


   Respiratory:  No cough, No dyspnea at rest, No dyspnea on exertion, No 

shortness of breath


   Cardiovascular:  No chest pain


   Abdomen:  No nausea, No pain


   Musculoskeletal:  + muscle pain, No calf pain, No joint pain, No swelling


   Genitourinary - Male:  No problem reported


   Hematologic / Lymphatic:  No abnormal bleeding/bruising


   Integumentary:  No problem reported


Physical Exam:  


   General Appearance:  WD/WN, no apparent distress


   Eyes:  normal inspection, PERRL, EOMI


   ENT:  hearing grossly normal, TMs normal, pharynx normal


   Neck:  supple (the patient had full range of motion of the neck in flexion 

and extension.  The previously noted limitation in right cervical rotation 

actually has improved.  There was no tenderness to palpation of the cervical 

spine.  There are no soft tissue masses appreciated in the posterior anterior 

neck nor in the supraclavicular areas.), no adenopathy, thyroid normal, no JVD


   Respiratory/Chest:  chest non-tender, lungs clear, normal breath sounds, no 

respiratory distress, no accessory muscle use


   Cardiovascular:  regular rate, rhythm, no edema, no gallop, + diastolic 

murmur (harsh diastolic murmurs heard best at the right upper sternal border), 

+ systolic murmur (a soft systolic murmur is appreciated at the apex.)


   Abdomen / GI:  normal bowel sounds, non tender, soft


   Extremities:  normal inspection, no calf tenderness, normal capillary refill

, no pedal edema


   Neurologic/Psychiatric:  no motor/sensory deficits, alert, normal mood/affect

, normal reflexes, oriented x 3


   Skin:  normal color, warm/dry, no rash





Hospital Course





56-year-old male with history of aortic insufficiency had reported 

approximately 10-14 day history of generalized illness.  The patient was seen 

at an outpatient urgent care center and was given a prescription for Zithromax.

  When his symptoms worsened, he presented to the [facility] urgent care clinic a 

second time and he was probably referred to the emergency department for 

further evaluation.  At his initial emergency Department edition the patient 

underwent a lumbar puncture which was unremarkable.  He also had blood cultures 

drawn.  Given his overall health and generally well appearance he was 

discharged home.  The following day, both blood cultures resulted as positive 

and the patient was found and told to return to the emergency department.





Given the patient's history of aortic insufficiency and a finding of a harsh 

diastolic murmur upon examination, initial suspicion was bacterial 

endocarditis.  The patient was started on ceftriaxone and vancomycin.  The 

patient underwent a transthoracic echocardiogram on the day of admission which 

did not reveal any vegetation.  The patient underwent a trans-esophageal 

echocardiogram the following day which revealed a large vegetation on the 

aortic valve and severe aortic insufficiency.





The patient had also noted some right-sided neck pain.  The patient had an MRI 

of the C-spine which did not reveal any evidence of infection or abscess.  It 

did show some cervical stenosis which the patient about previously.  Upon 

subsequent examinations, the patient's right-sided neck pain had improved.





Cardiology and infectious disease were consult.  Based on culture sensitivities

, [place] is found sensitive to Rocephin and thus the vancomycin was 

discontinued.  Infectious disease recommended Rocephin 2 g IV daily for 4 

weeks.  Case management was consult and they were able to arrange home nursing 

reports the patient had 100% coverage.


Total Time Spent:  Greater than 30 minutes


This includes examination of the patient, discharge planning, medication 

reconciliation, and communication with other providers.





Discharge Instructions


Please refer to the electronic Patient Visit Report (Discharge Instructions) 

for additional information.





Follow-Up





#1 the patient will follow-up with Dr. Alli Bartlett, cardiology.  Dr. Bartlett will 

coronary care with cardiothoracic surgery at [facility] to discuss 

replacement the patient's aortic valve.


#2 the patient will continue care with infectious disease.


#3 the patient will follow-up with his care provider within one to 2 weeks.


#4 the patient will receive Rocephin 2 g IV daily at his house.  Home nursing 

has been arranged.


#5 CBC CMP and ESR will be drawn weekly.

## 2017-03-01 NOTE — TEE
*NOTICE TO RECEIVING PARTY AGENCY**  This information is strictly Confidential and protected under 
[place] law.  [place] law prohibits you from making any further disclosure of this 
information unless further disclosure is expressly permitted by the written consent of the person to 
whom it pertains or is authorized by law.  A general authorization for the release of medical or 
other information is not sufficient for this purpose.  Hospital accepts no responsibility if the 
information is made available to any other person, INCLUDING THE PATIENT.



Interpretation Summary

  *  Name: KANA MUNOZ  Study Date: 2017 11:56 AM  BP: 139/37 mmHg

  *  MRN: [id number]  Patient Location: .2T\S\S243\S\1  HR: 68

  *  : 1960 (M/d/yyyy)  Gender: Male  Height: 72 in

  *  Age: 56 yrs  Ethnicity: [place]  Weight: 174 lb

  *  Ordering Physician: Martín Bartlett

  *  Referring Physician: Self, Referred

  *  Performed By: MICHELE Benavides RCS

  *  Accession# [id number]  Account# [id number]

  *  Reason For Study: Eval for Endocarditis

  *  BSA: 2.0 m2

  *  -- Conclusions --

  *  Large, mobile vegetation involving the aortic valve with some prolapse of the valve resulting 
in severe regurgitation

Procedure Details

  *  DEIDRE Probe #1 utilized for procedure.

  *  The study was performed in Cardiac Catheterization Lab.

  *  Time out was conducted by the physician, nurse, and echo tech with positive identification of 
patient and procedure.

  *  Informed consent for Transesophageal Echocardiogram was obtained prior to the procedure.

  *  An intravenous line was placed. A topical anesthetic agent was used for oropharangeal 
anesthesia. A bite block was inserted.

  *  The patient's vital signs, including blood pressure, heart rate, pulse oximetry and cardiac 
rhythm were monitored throughout the procedure .

  *  Fentanyl 50 mcg was administered for procedural sedation.

  *  Midazolam 3 mg administered for sedation.

  *  A multifrequency, multiplane transesopheageal echocardiographic endoscope was inserted and 
manipulated in the standard fashion to achieve multiplane views.

  *  The transesophageal probe was passed without difficulty.

  *  Contrast injection with agitated saline was performed.

  *  The patient tolerated the procedure well without evidence of orophangeal or esophageal trauma.

  *  A 2D transesophageal echocardiogram was performed.

  *  A 2D transesophageal echocardiogram with color flow Doppler was performed.

  *  A 2D transesophageal echocardiogram with Doppler and color flow Doppler was performed.

Left Ventricle

  *  The left ventricular ejection fraction is grossly normal.

Atria

  *  Injection of contrast documented no interatrial shunt.

Mitral Valve

  *  The mitral valve is normal.

  *  There is trace mitral regurgitation.

Tricuspid Valve

  *  The tricuspid valve is not well visualized, but is grossly normal.

Aortic Valve

  *  The aortic valve is trileaflet.

  *  An abscess cavity is not identified.

  *  There is a large vegetation or mass on the aortic valve.

  *  Large, mobile vegetation involving the aortic valve with some prolapse of the valve resulting 
in severe regurgitation

  *  Severe aortic regurgitation.

  *  There is an eccentric jet of aortic insufficiency directed against the anterior mitral leaflet.

Great Vessels

  *  The aortic root and proximal ascending aorta are normal sized.

Pericardium

  *  There is no pericardial effusion.

## 2017-03-01 NOTE — PROGRESS NOTE
Subjective


Date of Service:


Mar 1, 2017.


Subjective


Pt evaluation today including:  conversation w/ patient, physical exam, chart 

review, lab review


pt seen in follow up, had DEIDRE yesterday, revealed large, mobile AV veg with 

severe AR. He is OOB to chair this am. no cp, no sob, no leach, no f/c. has been 

on vanco and ctx and tolerating well. Initial cultures from 2/26 grew Strep 

mitis, repeats from 2/27 negative. feeling well today, asking to go home. Has 

appt at [facility] next week to discuss AVR. wbc nml, vanco trough 16. for picc 

placement today and d/c home on daily abx pending insurance approval. All 

remaining ros reviewed and are negative.





Problem List


Medical Problems:


(1) Bacteremia


Status: Acute  





(2) Body aches


Status: Acute  





(3) Headache


Status: Acute  





(4) Neck pain


Status: Acute  











Objective


Vital Signs











  Date Time  Temp Pulse Resp B/P Pulse Ox O2 Delivery O2 Flow Rate FiO2


 


3/1/17 07:47 36.8 64 18 131/66 97 Room Air  


 


3/1/17 04:00      Room Air  


 


3/1/17 03:56 37.0 64 18 124/70 97 Room Air  


 


3/1/17 00:01      Room Air  


 


2/28/17 23:04 37.1 69 18 128/60 96 Room Air  


 


2/28/17 20:00      Room Air  


 


2/28/17 19:20 37.1 72 18 130/53 98 Room Air  


 


2/28/17 16:00     98 Room Air  


 


2/28/17 15:44 36.6 76 18 130/59 98 Room Air  


 


2/28/17 14:07 36.7 63 18 131/61 97 Room Air  


 


2/28/17 13:15  63 21 131/41 95 Room Air  


 


2/28/17 13:04  67 20 144/48 93 Room Air  


 


2/28/17 12:54  62 19 128/47 92 Room Air  


 


2/28/17 12:45  71 15 117/58 98 Nasal Cannula 2.0 


 


2/28/17 12:43      Nasal Cannula 2.0 


 


2/28/17 12:40  70 19 153/49 99 Nasal Cannula 2.0 


 


2/28/17 12:35  71 11 144/52 99 Nasal Cannula 2.0 


 


2/28/17 12:30  70 17 137/36 98 Nasal Cannula 2.0 


 


2/28/17 12:26  65 15 139/37 100 Nasal Cannula 2.0 


 


2/28/17 11:45 36.8 62 16 157/68 98   


 


2/28/17 11:45  62 16 158/49 98 Room Air  


 


2/28/17 11:41 36.8 62 16 157/68 98 Room Air  











Physical Exam


General Appearance:  WD/WN, no apparent distress


Eyes:  EOMI


Neck:  supple


Respiratory/Chest:  lungs clear, normal breath sounds, no respiratory distress


Cardiovascular:  regular rate, rhythm, no edema, + diastolic murmur, + systolic 

murmur


Abdomen:  non tender, soft


Extremities:  non-tender, normal inspection, no pedal edema


Neurologic/Psychiatric:  alert, normal mood/affect


Skin:  normal color





Laboratory Results











Item Value  Date Time


 


Blood Culture - Preliminary Resulted 2/26/17 1313





Blood Gram Positive Cocci 


 


Blood Culture - Preliminary Resulted 2/26/17 1320





Blood Gram Positive Cocci 


 


Blood Culture - Final Complete 2/26/17 1313





Blood Streptococcus Mitis/Oralis 


 


Blood Culture - Final Complete 2/26/17 1320





Blood Streptococcus Mitis/Oralis 


 


Blood Culture - Preliminary Resulted 2/27/17 1557





Blood NO GROWTH TO DATE. 


 


Blood Culture - Preliminary Resulted 2/27/17 1559





Blood NO GROWTH TO DATE. 











Last 24 Hours








Test


  3/1/17


05:39


 


White Blood Count 8.20 K/uL 


 


Red Blood Count 4.63 M/uL 


 


Hemoglobin 13.3 g/dL 


 


Hematocrit 39.0 % 


 


Mean Corpuscular Volume 84.2 fL 


 


Mean Corpuscular Hemoglobin 28.7 pg 


 


Mean Corpuscular Hemoglobin


Concent 34.1 g/dl 


 


 


Platelet Count 302 K/uL 


 


Mean Platelet Volume 10.1 fL 


 


Neutrophils (%) (Auto) 61.1 % 


 


Lymphocytes (%) (Auto) 27.7 % 


 


Monocytes (%) (Auto) 8.9 % 


 


Eosinophils (%) (Auto) 1.2 % 


 


Basophils (%) (Auto) 0.5 % 


 


Neutrophils # (Auto) 5.01 K/uL 


 


Lymphocytes # (Auto) 2.27 K/uL 


 


Monocytes # (Auto) 0.73 K/uL 


 


Eosinophils # (Auto) 0.10 K/uL 


 


Basophils # (Auto) 0.04 K/uL 


 


RDW Standard Deviation 39.2 fL 


 


RDW Coefficient of Variation 12.9 % 


 


Immature Granulocyte % (Auto) 0.6 % 


 


Immature Granulocyte # (Auto) 0.05 K/uL 


 


Erythrocyte Sedimentation Rate 39 mm/hr 


 


Sodium Level 139 mmol/L 


 


Potassium Level 4.4 mmol/L 


 


Chloride Level 102 mmol/L 


 


Carbon Dioxide Level 30 mmol/L 


 


Anion Gap 7.0 mmol/L 


 


Blood Urea Nitrogen 12 mg/dl 


 


Creatinine 0.80 mg/dl 


 


Est Creatinine Clear Calc


Drug Dose 114.5 ml/min 


 


 


Estimated GFR (


American) 116.6 


 


 


Estimated GFR (Non-


American 100.6 


 


 


BUN/Creatinine Ratio 15.4 


 


Random Glucose 90 mg/dl 


 


Calcium Level 8.8 mg/dl 


 


Total Bilirubin 0.4 mg/dl 


 


Direct Bilirubin < 0.1 mg/dl 


 


Aspartate Amino Transf


(AST/SGOT) 19 U/L 


 


 


Alanine Aminotransferase


(ALT/SGPT) 56 U/L 


 


 


Alkaline Phosphatase 132 U/L 


 


C-Reactive Protein 2.94 mg/dl 


 


Total Protein 7.0 gm/dl 


 


Albumin 3.3 gm/dl 


 


Vancomycin Level Trough 16.0 mcg/ml 











Assessment and Plan





(1) Endocarditis


Assessment & Plan:  will change abx to ctx 2g iv daily, will need minimum 4 

weeks from first negative culture, 2/27 ngtd. will need weekly cbc, cmp, esr 

while on therapy. ok for picc. will need ID followup post d/c, would suggest he 

followup after he has consult for AVR next week so final duration can be 

determined depending on surgical timing. He will require prophylaxis in future 

with dental visits. ok for d/c from ID standpoint when outpt abx in place. will 

stop vanco. 





(2) Septicemia


Continued [facility] stay due to:  multiple IV medications needed, other (pending 

studies)


Discharge planning:  home with IV medication

## 2017-03-01 NOTE — DISCHARGE INSTRUCTIONS
Discharge Instructions


Admission


Reason for Admission:  Endocarditis; Septicemia





Discharge


Discharge Diagnosis / Problem:  Endocarditis





Discharge Goals


Goal(s):  Improve function, Learn about illness, Diagnostic testing, 

Therapeutic intervention, Prevent Disease Progression





Activity Recommendations


Activity Limitations:  resume your previous activity


Exercise/Sports Limitations:  as tolerated


May Resume Sexual Activity:  when tolerated


Shower/Bathe:  no limitations


Driving or Machine Use:  no limitations





.





Instructions / Follow-Up


Instructions / Follow-Up





1) Follow up with Infectious Disease.


2) Follow up with Cardiology.





Home Nursing


1) Ceftriaxone 2g IV daily x 4 weeks,


2) CBC, CMP, and ESR weekly.





Current Hospital Diet


Patient's current hospital diet: AHA Diet (Heart Healthy)





Discharge Diet


Recommended Diet:  Regular Diet





Procedures


Procedures Performed:  


1) Transthoracic echocardiogram


2) Transesophageal echocardiogram





Pending Studies


Studies pending at discharge:  no





Medical Emergencies








.


Who to Call and When:





Medical Emergencies:  If at any time you feel your situation is an emergency, 

please call 911 immediately.





.





Non-Emergent Contact


Non-Emergency issues call your:  Primary Care Provider, Cardiologist


Call Non-Emergent contact if:  temperature is above 100.5





.


.





"Provider Documentation" section prepared by Timi Alcantara.





VTE Core Measure


Inpt VTE Proph given/why not?:  T.E.D. Stockings





[place] Drug Monitoring Program


Drug Monitoring Findings:


No controlled substances issued.

## 2017-03-03 LAB
Lab: 0 % (ref 1–24)
Lab: 0 % (ref ?–0)
Lab: 0 % (ref ?–0)
Lab: 118 U/L (ref 40–115)
Lab: 67 % (ref 25–69)

## 2017-03-07 ENCOUNTER — HOSPITAL ENCOUNTER (OUTPATIENT)
Dept: HOSPITAL 45 - C.LABSPEC | Age: 57
Discharge: HOME | End: 2017-03-07
Attending: FAMILY MEDICINE
Payer: COMMERCIAL

## 2017-03-07 DIAGNOSIS — A41.9: Primary | ICD-10-CM

## 2017-03-07 LAB
ALBUMIN/GLOB SERPL: 0.9 {RATIO} (ref 0.9–2)
ALP SERPL-CCNC: 90 U/L (ref 45–117)
ALT SERPL-CCNC: 33 U/L (ref 12–78)
ANION GAP SERPL CALC-SCNC: 11 MMOL/L (ref 3–11)
AST SERPL-CCNC: 18 U/L (ref 15–37)
BASOPHILS # BLD: 0.02 K/UL (ref 0–0.2)
BASOPHILS NFR BLD: 0.3 %
BUN SERPL-MCNC: 13 MG/DL (ref 7–18)
BUN/CREAT SERPL: 16.5 (ref 10–20)
CALCIUM SERPL-MCNC: 9 MG/DL (ref 8.5–10.1)
CHLORIDE SERPL-SCNC: 105 MMOL/L (ref 98–107)
CO2 SERPL-SCNC: 25 MMOL/L (ref 21–32)
COMPLETE: YES
CREAT SERPL-MCNC: 0.77 MG/DL (ref 0.6–1.4)
EOSINOPHIL NFR BLD AUTO: 370 K/UL (ref 130–400)
GLOBULIN SER-MCNC: 3.9 GM/DL (ref 2.5–4)
GLUCOSE SERPL-MCNC: 123 MG/DL (ref 70–99)
HCT VFR BLD CALC: 38 % (ref 42–52)
IG%: 0.3 %
IMM GRANULOCYTES NFR BLD AUTO: 27.4 %
LYMPHOCYTES # BLD: 1.76 K/UL (ref 1.2–3.4)
MCH RBC QN AUTO: 29.1 PG (ref 25–34)
MCHC RBC AUTO-ENTMCNC: 33.9 G/DL (ref 32–36)
MCV RBC AUTO: 85.6 FL (ref 80–100)
MONOCYTES NFR BLD: 5.4 %
NEUTROPHILS # BLD AUTO: 0.9 %
NEUTROPHILS NFR BLD AUTO: 65.7 %
PMV BLD AUTO: 11.1 FL (ref 7.4–10.4)
POTASSIUM SERPL-SCNC: 4 MMOL/L (ref 3.5–5.1)
RBC # BLD AUTO: 4.44 M/UL (ref 4.7–6.1)
SODIUM SERPL-SCNC: 141 MMOL/L (ref 136–145)
WBC # BLD AUTO: 6.43 K/UL (ref 4.8–10.8)

## 2017-03-15 ENCOUNTER — HOSPITAL ENCOUNTER (OUTPATIENT)
Dept: HOSPITAL 45 - C.LABSPEC | Age: 57
Discharge: HOME | End: 2017-03-15
Attending: INTERNAL MEDICINE
Payer: COMMERCIAL

## 2017-03-15 DIAGNOSIS — R78.81: ICD-10-CM

## 2017-03-15 DIAGNOSIS — Z45.2: ICD-10-CM

## 2017-03-15 DIAGNOSIS — I38: Primary | ICD-10-CM

## 2017-03-15 LAB
ALBUMIN/GLOB SERPL: 1.1 {RATIO} (ref 0.9–2)
ALP SERPL-CCNC: 67 U/L (ref 45–117)
ALT SERPL-CCNC: 29 U/L (ref 12–78)
ANION GAP SERPL CALC-SCNC: 8 MMOL/L (ref 3–11)
AST SERPL-CCNC: 17 U/L (ref 15–37)
BUN SERPL-MCNC: 14 MG/DL (ref 7–18)
BUN/CREAT SERPL: 18.7 (ref 10–20)
CALCIUM SERPL-MCNC: 9.1 MG/DL (ref 8.5–10.1)
CHLORIDE SERPL-SCNC: 105 MMOL/L (ref 98–107)
CO2 SERPL-SCNC: 28 MMOL/L (ref 21–32)
CREAT SERPL-MCNC: 0.77 MG/DL (ref 0.6–1.4)
EOSINOPHIL NFR BLD AUTO: 273 K/UL (ref 130–400)
GLOBULIN SER-MCNC: 3.5 GM/DL (ref 2.5–4)
GLUCOSE SERPL-MCNC: 129 MG/DL (ref 70–99)
HCT VFR BLD CALC: 39.4 % (ref 42–52)
MCH RBC QN AUTO: 28.8 PG (ref 25–34)
MCHC RBC AUTO-ENTMCNC: 33.8 G/DL (ref 32–36)
MCV RBC AUTO: 85.3 FL (ref 80–100)
PMV BLD AUTO: 11.5 FL (ref 7.4–10.4)
POTASSIUM SERPL-SCNC: 3.8 MMOL/L (ref 3.5–5.1)
RBC # BLD AUTO: 4.62 M/UL (ref 4.7–6.1)
SODIUM SERPL-SCNC: 141 MMOL/L (ref 136–145)
WBC # BLD AUTO: 5.2 K/UL (ref 4.8–10.8)

## 2017-03-16 NOTE — CODING QUERY NO DIAGNOSIS
***Valid Physician Order Needed***



A valid physician order must be submitted in order to properly bill for the service(s) 
provided, including date of service(s), valid diagnosis, and physician signature. If these 
tests are done on a recurring basis the original physician order must be submitted in 
order to code and bill for the service(s) provided.



****Please fax us the original, signed physician order so that we may expedite billing to 
[phone]





DOS 03/15/2017



* SED-RATE



* CBCW/O DIFF



* CMP





Thank you  

[facility] Information Management

Phone:  [phone]

Fax:  [phone]

## 2017-03-21 ENCOUNTER — HOSPITAL ENCOUNTER (OUTPATIENT)
Dept: HOSPITAL 45 - C.LABSPEC | Age: 57
Discharge: HOME | End: 2017-03-21
Attending: FAMILY MEDICINE
Payer: COMMERCIAL

## 2017-03-21 ENCOUNTER — HOSPITAL ENCOUNTER (OUTPATIENT)
Dept: HOSPITAL 45 - C.LABSPEC | Age: 57
Discharge: HOME | End: 2017-03-21
Attending: INTERNAL MEDICINE
Payer: COMMERCIAL

## 2017-03-21 DIAGNOSIS — R78.81: ICD-10-CM

## 2017-03-21 DIAGNOSIS — R19.5: Primary | ICD-10-CM

## 2017-03-21 DIAGNOSIS — I38: Primary | ICD-10-CM

## 2017-03-21 LAB
ALBUMIN/GLOB SERPL: 1.1 {RATIO} (ref 0.9–2)
ALP SERPL-CCNC: 68 U/L (ref 45–117)
ALT SERPL-CCNC: 31 U/L (ref 12–78)
ANION GAP SERPL CALC-SCNC: 9 MMOL/L (ref 3–11)
AST SERPL-CCNC: 16 U/L (ref 15–37)
BASOPHILS # BLD: 0.03 K/UL (ref 0–0.2)
BASOPHILS NFR BLD: 0.6 %
BUN SERPL-MCNC: 10 MG/DL (ref 7–18)
BUN/CREAT SERPL: 12 (ref 10–20)
CALCIUM SERPL-MCNC: 9.4 MG/DL (ref 8.5–10.1)
CHLORIDE SERPL-SCNC: 106 MMOL/L (ref 98–107)
CO2 SERPL-SCNC: 27 MMOL/L (ref 21–32)
COMPLETE: YES
CREAT SERPL-MCNC: 0.81 MG/DL (ref 0.6–1.4)
EOSINOPHIL NFR BLD AUTO: 211 K/UL (ref 130–400)
GLOBULIN SER-MCNC: 3.6 GM/DL (ref 2.5–4)
GLUCOSE SERPL-MCNC: 123 MG/DL (ref 70–99)
HCT VFR BLD CALC: 40.9 % (ref 42–52)
IG%: 0.2 %
IMM GRANULOCYTES NFR BLD AUTO: 23.8 %
LYMPHOCYTES # BLD: 1.21 K/UL (ref 1.2–3.4)
MCH RBC QN AUTO: 28.8 PG (ref 25–34)
MCHC RBC AUTO-ENTMCNC: 34.2 G/DL (ref 32–36)
MCV RBC AUTO: 84.2 FL (ref 80–100)
MONOCYTES NFR BLD: 10.2 %
NEUTROPHILS # BLD AUTO: 2.4 %
NEUTROPHILS NFR BLD AUTO: 62.8 %
PMV BLD AUTO: 12.1 FL (ref 7.4–10.4)
POTASSIUM SERPL-SCNC: 3.9 MMOL/L (ref 3.5–5.1)
RBC # BLD AUTO: 4.86 M/UL (ref 4.7–6.1)
SODIUM SERPL-SCNC: 142 MMOL/L (ref 136–145)
WBC # BLD AUTO: 5.08 K/UL (ref 4.8–10.8)

## 2017-03-23 NOTE — CODING QUERY NO DIAGNOSIS
***Valid Physician Order Needed***





A valid physician order must be submitted in order to properly bill for the service(s) 
provided, including date of service(s), valid diagnosis, and physician signature. If these 
tests are done on a recurring basis the original physician order must be submitted in 
order to code and bill for the service(s) provided.





****Please fax us the original, signed physician order so that we may expedite billing to 
[phone]



DOS 03/21/17

* CMP, CBC, ESR





Thank you!  

Tsering Martines

Health Information Management

Phone:  [phone]

Fax:  [phone]

## 2017-03-28 ENCOUNTER — HOSPITAL ENCOUNTER (OUTPATIENT)
Dept: HOSPITAL 45 - C.LABSPEC | Age: 57
Discharge: HOME | End: 2017-03-28
Attending: FAMILY MEDICINE
Payer: COMMERCIAL

## 2017-03-28 DIAGNOSIS — I38: Primary | ICD-10-CM

## 2017-03-28 DIAGNOSIS — A41.9: ICD-10-CM

## 2017-03-28 LAB
ALBUMIN/GLOB SERPL: 0.9 {RATIO} (ref 0.9–2)
ALP SERPL-CCNC: 75 U/L (ref 45–117)
ALT SERPL-CCNC: 20 U/L (ref 12–78)
ANION GAP SERPL CALC-SCNC: 7 MMOL/L (ref 3–11)
AST SERPL-CCNC: 14 U/L (ref 15–37)
BASOPHILS # BLD: 0.04 K/UL (ref 0–0.2)
BASOPHILS NFR BLD: 0.6 %
BUN SERPL-MCNC: 12 MG/DL (ref 7–18)
BUN/CREAT SERPL: 15.2 (ref 10–20)
CALCIUM SERPL-MCNC: 9.5 MG/DL (ref 8.5–10.1)
CHLORIDE SERPL-SCNC: 106 MMOL/L (ref 98–107)
CO2 SERPL-SCNC: 28 MMOL/L (ref 21–32)
COMPLETE: YES
CREAT SERPL-MCNC: 0.8 MG/DL (ref 0.6–1.4)
EOSINOPHIL NFR BLD AUTO: 245 K/UL (ref 130–400)
GLOBULIN SER-MCNC: 3.9 GM/DL (ref 2.5–4)
GLUCOSE SERPL-MCNC: 132 MG/DL (ref 70–99)
HCT VFR BLD CALC: 35.5 % (ref 42–52)
IG%: 1.1 %
IMM GRANULOCYTES NFR BLD AUTO: 22.7 %
LYMPHOCYTES # BLD: 1.49 K/UL (ref 1.2–3.4)
MCH RBC QN AUTO: 28.6 PG (ref 25–34)
MCHC RBC AUTO-ENTMCNC: 34.4 G/DL (ref 32–36)
MCV RBC AUTO: 83.1 FL (ref 80–100)
MONOCYTES NFR BLD: 13 %
NEUTROPHILS # BLD AUTO: 2.3 %
NEUTROPHILS NFR BLD AUTO: 60.3 %
PMV BLD AUTO: 11.4 FL (ref 7.4–10.4)
POTASSIUM SERPL-SCNC: 3.9 MMOL/L (ref 3.5–5.1)
RBC # BLD AUTO: 4.27 M/UL (ref 4.7–6.1)
SODIUM SERPL-SCNC: 141 MMOL/L (ref 136–145)
WBC # BLD AUTO: 6.56 K/UL (ref 4.8–10.8)

## 2017-03-29 NOTE — CODING QUERY NO DIAGNOSIS
***Valid Physician Order Needed***

 62



A valid physician order must be submitted in order to properly bill for the service(s) 
provided, including date of service(s), valid diagnosis, and physician signature. If these 
tests are done on a recurring basis the original physician order must be submitted in 
order to code and bill for the service(s) provided.



****Please fax us the original, signed physician order so that we may expedite billing to 
[phone]



DOS 3/28/17

* COMPREHENSIVE METABOLIC

* CBC W/AUTO DIFF

* ESR





Thank you  

[facility] Information Management

Phone:  [phone]

Fax:  [phone]

## 2017-04-20 ENCOUNTER — HOSPITAL ENCOUNTER (EMERGENCY)
Dept: HOSPITAL 45 - C.EDB | Age: 57
LOS: 1 days | Discharge: HOME | End: 2017-04-21
Payer: COMMERCIAL

## 2017-04-20 VITALS
BODY MASS INDEX: 25.05 KG/M2 | BODY MASS INDEX: 25.05 KG/M2 | HEIGHT: 72.01 IN | WEIGHT: 184.97 LBS | WEIGHT: 184.97 LBS | HEIGHT: 72.01 IN

## 2017-04-20 DIAGNOSIS — I35.8: Primary | ICD-10-CM

## 2017-04-20 DIAGNOSIS — Z79.899: ICD-10-CM

## 2017-04-20 DIAGNOSIS — E78.5: ICD-10-CM

## 2017-04-20 DIAGNOSIS — Z84.1: ICD-10-CM

## 2017-04-20 DIAGNOSIS — Z83.79: ICD-10-CM

## 2017-04-20 DIAGNOSIS — K58.9: ICD-10-CM

## 2017-04-20 DIAGNOSIS — I10: ICD-10-CM

## 2017-04-20 DIAGNOSIS — Z79.82: ICD-10-CM

## 2017-04-20 DIAGNOSIS — Z82.49: ICD-10-CM

## 2017-04-20 LAB
ALBUMIN/GLOB SERPL: 1.1 {RATIO} (ref 0.9–2)
ALP SERPL-CCNC: 111 U/L (ref 45–117)
ALT SERPL-CCNC: 37 U/L (ref 12–78)
ANION GAP SERPL CALC-SCNC: 7 MMOL/L (ref 3–11)
APPEARANCE UR: CLEAR
AST SERPL-CCNC: 24 U/L (ref 15–37)
BASOPHILS # BLD: 0.04 K/UL (ref 0–0.2)
BASOPHILS NFR BLD: 0.5 %
BILIRUB UR-MCNC: (no result) MG/DL
BUN SERPL-MCNC: 12 MG/DL (ref 7–18)
BUN/CREAT SERPL: 17.8 (ref 10–20)
CALCIUM SERPL-MCNC: 9.2 MG/DL (ref 8.5–10.1)
CHLORIDE SERPL-SCNC: 106 MMOL/L (ref 98–107)
CKMB/CK RATIO: 2.3 (ref 0–3)
CO2 SERPL-SCNC: 28 MMOL/L (ref 21–32)
COLOR UR: YELLOW
COMPLETE: YES
CREAT CL PREDICTED SERPL C-G-VRATE: 134.8 ML/MIN
CREAT SERPL-MCNC: 0.68 MG/DL (ref 0.6–1.4)
CRP SERPL-MCNC: 4.32 MG/DL (ref 0–0.29)
EOSINOPHIL NFR BLD AUTO: 379 K/UL (ref 130–400)
GLOBULIN SER-MCNC: 3.3 GM/DL (ref 2.5–4)
GLUCOSE SERPL-MCNC: 108 MG/DL (ref 70–99)
HCT VFR BLD CALC: 31.4 % (ref 42–52)
IG%: 1.1 %
IMM GRANULOCYTES NFR BLD AUTO: 25.4 %
INR PPP: 1 (ref 0.9–1.1)
LYMPHOCYTES # BLD: 2.08 K/UL (ref 1.2–3.4)
MAGNESIUM SERPL-MCNC: 2 MG/DL (ref 1.8–2.4)
MANUAL MICROSCOPIC REQUIRED?: NO
MCH RBC QN AUTO: 28 PG (ref 25–34)
MCHC RBC AUTO-ENTMCNC: 32.8 G/DL (ref 32–36)
MCV RBC AUTO: 85.3 FL (ref 80–100)
MONOCYTES NFR BLD: 13.4 %
NEUTROPHILS # BLD AUTO: 6.3 %
NEUTROPHILS NFR BLD AUTO: 53.3 %
NITRITE UR QL STRIP: (no result)
PARTIAL THROMBOPLASTIN RATIO: 1.1
PH UR STRIP: 6 [PH] (ref 4.5–7.5)
PHOSPHATE SERPL-MCNC: 2.6 MG/DL (ref 2.5–4.9)
PMV BLD AUTO: 9.4 FL (ref 7.4–10.4)
POTASSIUM SERPL-SCNC: 4 MMOL/L (ref 3.5–5.1)
PROTHROMBIN TIME: 10.5 SECONDS (ref 9–12)
RBC # BLD AUTO: 3.68 M/UL (ref 4.7–6.1)
REVIEW REQ?: NO
SODIUM SERPL-SCNC: 141 MMOL/L (ref 136–145)
SP GR UR STRIP: 1.01 (ref 1–1.03)
UROBILINOGEN UR-MCNC: (no result) MG/DL
WBC # BLD AUTO: 8.2 K/UL (ref 4.8–10.8)
ZZUR CULT IF INDIC CLEAN CATCH: NO

## 2017-04-20 NOTE — EMERGENCY ROOM VISIT NOTE
History


Report prepared by Farida:  Luis Sheriff


Under the Supervision of:  Dr. Martín Nieto D.O.


First contact with patient:  21:54


Chief Complaint:  CHEST PAIN


Stated Complaint:  HEART VALVE REPLACEMENT, CHEST PAIN





History of Present Illness


The patient is a 56 year old male who presents to the Emergency Room with 

complaints of a persistent fever that started earlier today. The patient became 

febrile with a temperature around 100 today. He did not take anything for the 

fever prior to arrival. He has had rhinorrhea and cough for several days. He is 

s/p valve replacement one week ago performed at [place]. He called [facility] and was told to come to the ED. The patient had endocarditis leading to 

the surgery. The source of his endocarditis was reportedly a dental infection. 

He was receiving IV antibiotics for one month through a PICC line leading up to 

the surgery. He was on Rocephin and Amoxicillin, with his last dose being the 

day before the surgery last week. The patient reportedly had an episode of A-

fib after the surgery which is resolved.





   Source of History:  patient


   Onset:  today


   Position:  other (global)


   Symptom Intensity:  around 100


   Quality:  other (febrile)


   Timing:  other (persistent)


   Associated Symptoms:  + cough





Review of Systems


See HPI for pertinent positives & negatives. A total of 10 systems reviewed and 

were otherwise negative.





Past Medical & Surgical


Medical Problems:


(1) Hyperlipidemia Nec/Nos


(2) Hypertension Nos


(3) Irritable Bowel Syndrome








Family History





FH: heart disease


FHx: gallbladder disease


Kidney disease


Kidney stones





Social History


Smoking Status:  Never Smoker


Alcohol Use:  none


Drug Use:  none


Marital Status:  


Housing Status:  lives with family


Occupation Status:  employed





Current/Historical Medications


Scheduled


Aspirin (Aspirin Ec), 81 MG PO DAILY


Atorvastatin (Atorvastatin Calcium), 10 MG PO HS


Lansoprazole (Prevacid), 30 MG PO DAILY


Metoprolol Tartrate (Lopressor) (Lopressor), 25 MG PO BID





Scheduled PRN


Cetirizine (Zyrtec), 10 MG PO DAILY PRN for Allergy Symptoms


Naproxen (Naproxen), 500 MG PO BID PRN for Pain





Allergies


Coded Allergies:  


     No Known Allergies (Unverified , 4/20/17)





Physical Exam


Vital Signs











  Date Time  Temp Pulse Resp B/P Pulse Ox O2 Delivery O2 Flow Rate FiO2


 


4/21/17 00:19 37.1 85 16 156/99 97 Room Air  


 


4/20/17 22:29  86 18 153/97 97 Room Air  


 


4/20/17 22:24      Room Air  


 


4/20/17 22:04     97 Room Air  


 


4/20/17 21:56  95      


 


4/20/17 21:51 37.3 92 22 138/105 97 Room Air  











Physical Exam


GENERAL:  Patient is awake, alert, and in no acute distress. Patient is resting 

comfortably and showing no signs of anxiety


EYES: The conjunctivae are clear.  The pupils are round and reactive. 


EARS, NOSE, MOUTH AND THROAT: The nose is without any evidence of any 

deformity. Mucous membranes are moist tongue is midline 


NECK: The neck is nontender and supple.


RESPIRATORY: Diminished breath sounds noted throughout, scattered rhonchi noted

, no tachypnea or conversational dyspnea.


CARDIOVASCULAR:  Regular rate and rhythm noted, no murmur was noted to 

auscultation.


GASTROINTESTINAL: The abdomen is soft. Bowel sounds are present in all 

quadrants. Abdomen is nontender


MUSCULOSKELETAL/EXTREMITIES: There is no evidence of gross deformity full range 

of motion is noted in the hips and shoulders


SKIN: No pedal edema was noted. Skin is warm and dry. Healing sternal post-

surgical scar in mid chest, no erythema drainage or dehiscence noted.


NEUROLOGIC:  Patient is awake alert and oriented x3.





Medical Decision & Procedures


ER Provider


Diagnostic Interpretation:


X-ray results as stated below per interpretation by me and the radiologist. 





CHEST ONE VIEW PORTABLE





HISTORY:      Sepsis





COMPARISON: Chest 2/26/2017.





FINDINGS: The lungs are clear. No pleural effusions. No pneumothorax.


Poststernotomy changes. The heart is mildly enlarged.





IMPRESSION:


Mild cardiomegaly.











Electronically signed by:  Trip Avina M.D.


4/20/2017 10:37 PM





Dictated Date/Time:  4/20/2017 10:36 PM





Laboratory Results


4/20/17 22:10








Red Blood Count 3.68, Mean Corpuscular Volume 85.3, Mean Corpuscular Hemoglobin 

28.0, Mean Corpuscular Hemoglobin Concent 32.8, Mean Platelet Volume 9.4, 

Neutrophils (%) (Auto) 53.3, Lymphocytes (%) (Auto) 25.4, Monocytes (%) (Auto) 

13.4, Eosinophils (%) (Auto) 6.3, Basophils (%) (Auto) 0.5, Neutrophils # (Auto

) 4.37, Lymphocytes # (Auto) 2.08, Monocytes # (Auto) 1.10, Eosinophils # (Auto

) 0.52, Basophils # (Auto) 0.04





4/20/17 22:10

















Test


  4/20/17


22:10 4/20/17


22:20 4/20/17


22:27 4/20/17


22:30


 


White Blood Count


  8.20 K/uL


(4.8-10.8) 


  


  


 


 


Red Blood Count


  3.68 M/uL


(4.7-6.1) 


  


  


 


 


Hemoglobin


  10.3 g/dL


(14.0-18.0) 


  


  


 


 


Hematocrit 31.4 % (42-52)    


 


Mean Corpuscular Volume


  85.3 fL


() 


  


  


 


 


Mean Corpuscular Hemoglobin


  28.0 pg


(25-34) 


  


  


 


 


Mean Corpuscular Hemoglobin


Concent 32.8 g/dl


(32-36) 


  


  


 


 


Platelet Count


  379 K/uL


(130-400) 


  


  


 


 


Mean Platelet Volume


  9.4 fL


(7.4-10.4) 


  


  


 


 


Neutrophils (%) (Auto) 53.3 %    


 


Lymphocytes (%) (Auto) 25.4 %    


 


Monocytes (%) (Auto) 13.4 %    


 


Eosinophils (%) (Auto) 6.3 %    


 


Basophils (%) (Auto) 0.5 %    


 


Neutrophils # (Auto)


  4.37 K/uL


(1.4-6.5) 


  


  


 


 


Lymphocytes # (Auto)


  2.08 K/uL


(1.2-3.4) 


  


  


 


 


Monocytes # (Auto)


  1.10 K/uL


(0.11-0.59) 


  


  


 


 


Eosinophils # (Auto)


  0.52 K/uL


(0-0.5) 


  


  


 


 


Basophils # (Auto)


  0.04 K/uL


(0-0.2) 


  


  


 


 


RDW Standard Deviation


  42.7 fL


(36.4-46.3) 


  


  


 


 


RDW Coefficient of Variation


  13.6 %


(11.5-14.5) 


  


  


 


 


Immature Granulocyte % (Auto) 1.1 %    


 


Immature Granulocyte # (Auto)


  0.09 K/uL


(0.00-0.02) 


  


  


 


 


Erythrocyte Sedimentation Rate


  54 mm/hr


(0-14) 


  


  


 


 


Prothrombin Time


  10.5 SECONDS


(9.0-12.0) 


  


  


 


 


Prothromb Time International


Ratio 1.0 (0.9-1.1) 


  


  


  


 


 


Activated Partial


Thromboplast Time 29.7 SECONDS


(21.0-31.0) 


  


  


 


 


Partial Thromboplastin Ratio 1.1    


 


Anion Gap


  7.0 mmol/L


(3-11) 


  


  


 


 


Est Creatinine Clear Calc


Drug Dose 134.8 ml/min 


  


  


  


 


 


Estimated GFR (


American) 124.6 


  


  


  


 


 


Estimated GFR (Non-


American 107.5 


  


  


  


 


 


BUN/Creatinine Ratio 17.8 (10-20)    


 


Calcium Level


  9.2 mg/dl


(8.5-10.1) 


  


  


 


 


Phosphorus Level


  2.6 mg/dl


(2.5-4.9) 


  


  


 


 


Magnesium Level


  2.0 mg/dl


(1.8-2.4) 


  


  


 


 


Total Bilirubin


  0.3 mg/dl


(0.2-1) 


  


  


 


 


Aspartate Amino Transf


(AST/SGOT) 24 U/L (15-37) 


  


  


  


 


 


Alanine Aminotransferase


(ALT/SGPT) 37 U/L (12-78) 


  


  


  


 


 


Alkaline Phosphatase


  111 U/L


() 


  


  


 


 


Total Creatine Kinase


  92 U/L


() 


  


  


 


 


Creatine Kinase MB


  2.1 ng/ml


(0.5-3.6) 


  


  


 


 


Creatine Kinase MB Ratio 2.3 (0-3.0)    


 


Troponin I


  0.082 ng/ml


(0-0.045) 


  


  


 


 


C-Reactive Protein


  4.32 mg/dl


(0-0.29) 


  


  


 


 


Pro-B-Type Natriuretic Peptide


  439 pg/ml


(0-900) 


  


  


 


 


Total Protein


  6.8 gm/dl


(6.4-8.2) 


  


  


 


 


Albumin


  3.5 gm/dl


(3.4-5.0) 


  


  


 


 


Globulin


  3.3 gm/dl


(2.5-4.0) 


  


  


 


 


Albumin/Globulin Ratio 1.1 (0.9-2)    


 


Lipase


  411 U/L


() 


  


  


 


 


Influenza Type A (RT-PCR)


  


  Neg for Influ


A (NEG) 


  


 


 


Influenza Type A Antigen


  


  Neg for Influ


A (NEG) 


  


 


 


Influenza Type B Antigen


  


  Neg for Influ


B (NEG) 


  


 


 


Influenza Type B (RT-PCR)


  


  Neg for Influ


B (NEG) 


  


 


 


Bedside Lactic Acid Venous


  


  


  1.25 mmol/L


(0.90-1.70) 


 


 


Urine Color    YELLOW 


 


Urine Appearance    CLEAR (CLEAR) 


 


Urine pH    6.0 (4.5-7.5) 


 


Urine Specific Gravity


  


  


  


  1.010


(1.000-1.030)


 


Urine Protein    NEG (NEG) 


 


Urine Glucose (UA)    NEG (NEG) 


 


Urine Ketones    NEG (NEG) 


 


Urine Occult Blood    NEG (NEG) 


 


Urine Nitrite    NEG (NEG) 


 


Urine Bilirubin    NEG (NEG) 


 


Urine Urobilinogen    NEG (NEG) 


 


Urine Leukocyte Esterase    NEG (NEG) 





Laboratory results per my review.





ECG


Indication:  other (s/p mitral valve repair)


Rate (beats per minute):  93


Rhythm:  normal sinus


Findings:  T-wave inversion (diffuse), no acute ischemic change, no ectopy


Comparison ECG Date:  27 Feb. 2017


Change:  no significant change





ED Course


2155: The patient was evaluated in room C7. A complete history and physical 

examination were performed. 





2336: Discussed the case with Dr. Gaspar, Cardiothoracic surgeon, who is 

covering for Dr. Gonzalez. The patient will follow up.





2340: Reassessed the patient. Discussed the discharge instructions with him. He 

verbalized understanding and agreement. The patient is ready for discharge.





Medical Decision


Prior records/ancillary studies reviewed.


Triage Nursing notes reviewed.





The patient's history was concerning for fever.





Differential diagnosis:


Etiologies such as viral syndrome, otitis, pharyngitis, pneumonia, influenza, 

meningitis, urinary tract infection, sepsis, bacteremia, as well as others were 

entertained.





The patient is a 56-year-old male who is status post aortic valve replacement 

after having an aortic valve replacement for endocarditis. Currently the 

patient is not taking any antibiotics. The patient was instructed to come to 

the emergency department by his surgical group when he called them about the 

fever. No source for the fever was found. I discussed the patient's laboratory 

and radiographic studies with him. His white blood cell count was normal but 

his sedimentation rate was elevated. It appears that his sedimentation rate has 

been elevated since he had surgery. I discussed the patient's laboratory and 

radiographic studies with his surgical group at [facility]. The 

patient is scheduled for a follow-up appointment tomorrow. He was encouraged to 

keep this appointment. The covering physician stated that they would likely do 

an echocardiogram to evaluate the valve at that time. The patient was also 

encouraged to return to the emergency department immediately if symptoms change 

worsen or the need arises. I inquired about giving the patient dose of 

antibiotics while he was in the emergency department but the covering surgeon 

felt this could have the possibility of confusing his overall scenario rather 

he recommended the patient return to the emergency department if he develops 

any further fevers or worsening symptoms.





Consults


Time Called:  2320


Consulting Physician:  Dr. Gaspar, Cardiothoracic surgeon


Returned Call:  [phone]


2336: Discussed the case with Dr. Gaspar, Cardiothoracic surgeon, who is 

covering for Dr. Gonzalez. The patient will follow up.





Impression





 Primary Impression:  


 Fever


 Additional Impression:  


 S/P aortic valve replacement





Scribe Attestation


The scribe's documentation has been prepared under my direction and personally 

reviewed by me in its entirety. I confirm that the note above accurately 

reflects all work, treatment, procedures, and medical decision making performed 

by me.





Departure Information


Dispostion


Home / Self-Care





Referrals


Martín Bartlett M.D. (PCP)





Forms


IMPORTANT VISIT INFORMATION





Patient Instructions


ED Fever Control, [facility]





Additional Instructions





Follow-up with your cardiothoracic surgeon tomorrow. Continue all medications 

as prescribed. Rest and avoid any strenuous activity.





Problem Qualifiers








 Primary Impression:  


 Fever


 Fever type:  unspecified  Qualified Codes:  R50.9 - Fever, unspecified

## 2017-04-20 NOTE — DIAGNOSTIC IMAGING REPORT
CHEST ONE VIEW PORTABLE



HISTORY:      Sepsis



COMPARISON: Chest 2/26/2017.



FINDINGS: The lungs are clear. No pleural effusions. No pneumothorax.

Poststernotomy changes. The heart is mildly enlarged.



IMPRESSION:

Mild cardiomegaly.







Electronically signed by:  Trip Avina M.D.

4/20/2017 10:37 PM



Dictated Date/Time:  4/20/2017 10:36 PM

## 2017-04-21 VITALS
DIASTOLIC BLOOD PRESSURE: 99 MMHG | SYSTOLIC BLOOD PRESSURE: 156 MMHG | OXYGEN SATURATION: 97 % | HEART RATE: 85 BPM | TEMPERATURE: 98.78 F

## 2017-04-21 LAB
FLUAV RNA SPEC QL NAA+PROBE: (no result)
FLUBV RNA SPEC QL NAA+PROBE: (no result)

## 2017-07-11 ENCOUNTER — HOSPITAL ENCOUNTER (OUTPATIENT)
Dept: HOSPITAL 45 - C.LAB1850 | Age: 57
Discharge: HOME | End: 2017-07-11
Attending: FAMILY MEDICINE
Payer: COMMERCIAL

## 2017-07-11 DIAGNOSIS — R73.09: ICD-10-CM

## 2017-07-11 DIAGNOSIS — E78.5: Primary | ICD-10-CM

## 2017-07-11 DIAGNOSIS — R03.0: ICD-10-CM

## 2017-07-11 LAB
ALT SERPL-CCNC: 31 U/L (ref 12–78)
ANION GAP SERPL CALC-SCNC: 6 MMOL/L (ref 3–11)
BUN SERPL-MCNC: 14 MG/DL (ref 7–18)
BUN/CREAT SERPL: 17.5 (ref 10–20)
CALCIUM SERPL-MCNC: 9 MG/DL (ref 8.5–10.1)
CHLORIDE SERPL-SCNC: 106 MMOL/L (ref 98–107)
CHOLEST/HDLC SERPL: 3 {RATIO}
CO2 SERPL-SCNC: 27 MMOL/L (ref 21–32)
CREAT SERPL-MCNC: 0.77 MG/DL (ref 0.6–1.4)
EST. AVERAGE GLUCOSE BLD GHB EST-MCNC: 114 MG/DL
GLUCOSE SERPL-MCNC: 94 MG/DL (ref 70–99)
GLUCOSE UR QL: 48 MG/DL
KETONES UR QL STRIP: 75 MG/DL
NITRITE UR QL STRIP: 106 MG/DL (ref 0–150)
PH UR: 144 MG/DL (ref 0–200)
POTASSIUM SERPL-SCNC: 3.9 MMOL/L (ref 3.5–5.1)
SODIUM SERPL-SCNC: 139 MMOL/L (ref 136–145)
VERY LOW DENSITY LIPOPROT CALC: 21 MG/DL

## 2018-01-12 ENCOUNTER — HOSPITAL ENCOUNTER (OUTPATIENT)
Dept: HOSPITAL 45 - C.LAB1850 | Age: 58
Discharge: HOME | End: 2018-01-12
Attending: FAMILY MEDICINE
Payer: COMMERCIAL

## 2018-01-12 DIAGNOSIS — R73.09: ICD-10-CM

## 2018-01-12 DIAGNOSIS — E78.5: Primary | ICD-10-CM

## 2018-01-12 LAB
ALT SERPL-CCNC: 44 U/L (ref 12–78)
BUN SERPL-MCNC: 15 MG/DL (ref 7–18)
CALCIUM SERPL-MCNC: 9.2 MG/DL (ref 8.5–10.1)
CO2 SERPL-SCNC: 27 MMOL/L (ref 21–32)
CREAT SERPL-MCNC: 0.72 MG/DL (ref 0.6–1.4)
GLUCOSE SERPL-MCNC: 95 MG/DL (ref 70–99)
HBA1C MFR BLD: 5.7 % (ref 4.5–5.6)
KETONES UR QL STRIP: 83 MG/DL
PH UR: 151 MG/DL (ref 0–200)
POTASSIUM SERPL-SCNC: 4.1 MMOL/L (ref 3.5–5.1)
SODIUM SERPL-SCNC: 137 MMOL/L (ref 136–145)